# Patient Record
Sex: MALE | Race: WHITE
[De-identification: names, ages, dates, MRNs, and addresses within clinical notes are randomized per-mention and may not be internally consistent; named-entity substitution may affect disease eponyms.]

---

## 2017-05-12 ENCOUNTER — HOSPITAL ENCOUNTER (OUTPATIENT)
Dept: HOSPITAL 47 - RADCTMAIN | Age: 62
Discharge: HOME | End: 2017-05-12
Payer: COMMERCIAL

## 2017-05-12 DIAGNOSIS — R93.0: Primary | ICD-10-CM

## 2017-05-12 DIAGNOSIS — R51: ICD-10-CM

## 2017-05-12 PROCEDURE — 70460 CT HEAD/BRAIN W/DYE: CPT

## 2017-05-12 NOTE — CT
EXAMINATION TYPE: CT brain w con

 

DATE OF EXAM: 5/12/2017 3:52 PM

 

COMPARISON: NONE

 

HISTORY: Headaches without trauma

 

CT DLP: 1121 mGycm

Automated exposure control for dose reduction was used.

 

CONTRAST: 

CT scan of the head is performed with IV Contrast, patient injected with 100 mL of Omnipaque 300.

 

FINDINGS: There is an area of low-attenuation in the right frontal lobe adjacent to the frontal horn 
of the right lateral ventricle. This shows no abnormal enhancement and may relate to previous infarct
.

 

Central structures are midline. There is no evidence of hydrocephalus. There is no mass effect or mid
line shift. I do not see evidence of intracranial blood.

 

Following intravenous administration of contrast but do not see evidence of abnormal enhancement.

 

Visualized portions of the paranasal sinuses and mastoids are clear. No depressed skull fracture is s
een.

 

IMPRESSION:

1. NONENHANCING AREA OF LOW ATTENUATION IN THE RIGHT FRONTAL LOBE CONSISTENT WITH OLD VASCULAR INJURY
.

2. NO AREAS OF ABNORMAL ENHANCEMENT.

## 2017-08-28 ENCOUNTER — HOSPITAL ENCOUNTER (OUTPATIENT)
Dept: HOSPITAL 47 - LABPAT | Age: 62
Discharge: HOME | End: 2017-08-28
Payer: COMMERCIAL

## 2017-08-28 DIAGNOSIS — Z01.812: Primary | ICD-10-CM

## 2017-08-28 DIAGNOSIS — I25.10: ICD-10-CM

## 2017-08-28 LAB
ANION GAP SERPL CALC-SCNC: 9 MMOL/L
BUN SERPL-SCNC: 15 MG/DL (ref 9–20)
CH: 31.9
CHCM: 34.3
CHLORIDE SERPL-SCNC: 104 MMOL/L (ref 98–107)
CO2 SERPL-SCNC: 28 MMOL/L (ref 22–30)
ERYTHROCYTE [DISTWIDTH] IN BLOOD BY AUTOMATED COUNT: 4.76 M/UL (ref 4.3–5.9)
ERYTHROCYTE [DISTWIDTH] IN BLOOD: 15.1 % (ref 11.5–15.5)
HCT VFR BLD AUTO: 44.6 % (ref 39–53)
HDW: 3.08
HGB BLD-MCNC: 14.7 GM/DL (ref 13–17.5)
MCH RBC QN AUTO: 30.8 PG (ref 25–35)
MCHC RBC AUTO-ENTMCNC: 32.9 G/DL (ref 31–37)
MCV RBC AUTO: 93.7 FL (ref 80–100)
NON-AFRICAN AMERICAN GFR(MDRD): >60
POTASSIUM SERPL-SCNC: 5 MMOL/L (ref 3.5–5.1)
SODIUM SERPL-SCNC: 141 MMOL/L (ref 137–145)
WBC # BLD AUTO: 6.3 K/UL (ref 3.8–10.6)

## 2017-08-28 PROCEDURE — 85027 COMPLETE CBC AUTOMATED: CPT

## 2017-08-28 PROCEDURE — 84520 ASSAY OF UREA NITROGEN: CPT

## 2017-08-28 PROCEDURE — 80051 ELECTROLYTE PANEL: CPT

## 2017-08-28 PROCEDURE — 82565 ASSAY OF CREATININE: CPT

## 2017-09-29 ENCOUNTER — HOSPITAL ENCOUNTER (OUTPATIENT)
Dept: HOSPITAL 47 - RADMRIMAIN | Age: 62
Discharge: HOME | End: 2017-09-29
Payer: COMMERCIAL

## 2017-09-29 DIAGNOSIS — R90.82: ICD-10-CM

## 2017-09-29 DIAGNOSIS — G93.89: ICD-10-CM

## 2017-09-29 DIAGNOSIS — I65.21: Primary | ICD-10-CM

## 2017-09-29 PROCEDURE — 70553 MRI BRAIN STEM W/O & W/DYE: CPT

## 2017-09-29 PROCEDURE — 70544 MR ANGIOGRAPHY HEAD W/O DYE: CPT

## 2017-09-29 NOTE — MR
EXAMINATION TYPE: MR brain wo/w con, MR angio head wo con

 

DATE OF EXAM: 9/29/2017

 

COMPARISON: CT brain dated 5/12/2017

 

HISTORY: TIA

 

TECHNIQUE: 

Multiplanar, multisequence images of the brain and brainstem is performed without and with IV contras
t, utilizing 7 mL intravenous Gadavist . MRA head was also performed and time of flight images focusi
ng on the [place] of [place] were performed without contrast.

 

FINDINGS: 

 

MR BRAIN: Diffusion weighted images demonstrate no evidence of a recent infarct or other diffusion ab
normality.  There is no extra-axial fluid collection.  T2 hyperintense area within the right frontal 
lobe and insular cortex does not restricted diffusion and relates to encephalomalacia from prior infa
rct in the distribution of the right middle cerebral artery. Numerous other foci of T2 hyperintensity
 are scattered throughout the subcortical and periventricular white matter bilaterally. The prior are
a of encephalomalacia nor the T2 plaques enhance. No enhancing mass or any abnormal enhancement is se
en within the brain. Note is made of high riding right jugular bulb. Lack of flow-void is seen within
 the right internal carotid artery as it enters the calvarium extending through the petrous portion a
nd cavernous portion into the inferior aspect of the supraclinoid portion. The ventricular system and
 cisternal spaces are normal in size and appearance.  The brain volume is age appropriate.

 

Midline structures demonstrate normal morphology.  The craniocervical junction appears within normal 
limits.  The dural venous sinuses appear patent. The globes are intact. Minimal mucosal thickening is
 seen within the ethmoid and maxillary sinuses. Remaining paranasal sinuses and mastoid air cells are
 well aerated.

 

ANGIO HEAD: There is complete occlusion of the right internal carotid artery as it enters the calvari
um, within the petrous portion, cavernous portion, and some of the supraclinoid portion. There is rec
onstitution at the cranial aspect of the supraclinoid right internal carotid artery with asymmetric o
pacification of the right ophthalmic artery in relation to the left, although there is flow demonstra
tiki. Reconstitution of the right hemisphere branch vessels of the MCA and LILLIANA is seen by a complete c
ircle of [place]. No evidence of aneurysm is seen. A false lumen is not identified as there is near to
vonda lack of flow void on the MR brain. The vertebral arteries are codominant.

 

IMPRESSION: 

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 
segment to the inferior aspect of the supraclinoid portion with asymmetric enhancement of the right o
phthalmic artery, although the artery does appear patent. Encephalomalacia from prior right frontal M
CA branch vessel infarct is seen as well as numerous other nonenhancing white matter changes bilatera
lly within the supratentorium. No evidence of restricted diffusion to suggest acute infarct. Consider
ation should be given to embolic disease given the numerous bilateral plaques although these could al
so represent chronic microangiopathy. No discrete intimal flap is seen within the right carotid arter
y to indicate dissection. Right hemispheric supratentorial and infratentorial flow is maintained by a
 complete [place].

 

A [place] message has been communicated to Ariel Philip MD via the [employer] Critical Result
 system on 9/29/2017 7:28 AM, Message ID [id number].

## 2018-01-16 ENCOUNTER — HOSPITAL ENCOUNTER (OUTPATIENT)
Dept: HOSPITAL 47 - RADXRMAIN | Age: 63
Discharge: HOME | End: 2018-01-16
Attending: PHYSICIAN ASSISTANT
Payer: COMMERCIAL

## 2018-01-16 DIAGNOSIS — S46.901A: Primary | ICD-10-CM

## 2018-01-16 NOTE — XR
EXAMINATION TYPE: XR humerus RT

 

DATE OF EXAM: 1/16/2018

 

COMPARISON: NONE

 

HISTORY: Pain and swelling

 

TECHNIQUE: 2 views

 

FINDINGS: I see no fracture nor dislocation. The shoulder joint and elbow joint appear intact.

 

IMPRESSION: Negative right humerus exam.

## 2018-02-28 ENCOUNTER — HOSPITAL ENCOUNTER (OUTPATIENT)
Dept: HOSPITAL 47 - EC | Age: 63
Setting detail: OBSERVATION
LOS: 1 days | Discharge: HOME | End: 2018-03-01
Payer: COMMERCIAL

## 2018-02-28 DIAGNOSIS — R42: ICD-10-CM

## 2018-02-28 DIAGNOSIS — Z87.891: ICD-10-CM

## 2018-02-28 DIAGNOSIS — R06.02: ICD-10-CM

## 2018-02-28 DIAGNOSIS — E66.9: ICD-10-CM

## 2018-02-28 DIAGNOSIS — I65.21: ICD-10-CM

## 2018-02-28 DIAGNOSIS — I10: ICD-10-CM

## 2018-02-28 DIAGNOSIS — Z79.899: ICD-10-CM

## 2018-02-28 DIAGNOSIS — R06.00: ICD-10-CM

## 2018-02-28 DIAGNOSIS — I35.2: ICD-10-CM

## 2018-02-28 DIAGNOSIS — Z79.02: ICD-10-CM

## 2018-02-28 DIAGNOSIS — Z90.49: ICD-10-CM

## 2018-02-28 DIAGNOSIS — I35.1: ICD-10-CM

## 2018-02-28 DIAGNOSIS — Z86.73: ICD-10-CM

## 2018-02-28 DIAGNOSIS — E78.1: ICD-10-CM

## 2018-02-28 DIAGNOSIS — Z82.49: ICD-10-CM

## 2018-02-28 DIAGNOSIS — Z79.82: ICD-10-CM

## 2018-02-28 DIAGNOSIS — R07.89: Primary | ICD-10-CM

## 2018-02-28 DIAGNOSIS — E78.5: ICD-10-CM

## 2018-02-28 LAB
ALBUMIN SERPL-MCNC: 4.4 G/DL (ref 3.5–5)
ALP SERPL-CCNC: 65 U/L (ref 38–126)
ALT SERPL-CCNC: 39 U/L (ref 21–72)
ANION GAP SERPL CALC-SCNC: 11 MMOL/L
APTT BLD: 25.3 SEC (ref 22–30)
AST SERPL-CCNC: 27 U/L (ref 17–59)
BASOPHILS # BLD AUTO: 0 K/UL (ref 0–0.2)
BASOPHILS NFR BLD AUTO: 1 %
BUN SERPL-SCNC: 15 MG/DL (ref 9–20)
CALCIUM SPEC-MCNC: 9.2 MG/DL (ref 8.4–10.2)
CHLORIDE SERPL-SCNC: 107 MMOL/L (ref 98–107)
CK SERPL-CCNC: 173 U/L (ref 55–170)
CK SERPL-CCNC: 207 U/L (ref 55–170)
CO2 SERPL-SCNC: 25 MMOL/L (ref 22–30)
EOSINOPHIL # BLD AUTO: 0.3 K/UL (ref 0–0.7)
EOSINOPHIL NFR BLD AUTO: 6 %
ERYTHROCYTE [DISTWIDTH] IN BLOOD BY AUTOMATED COUNT: 4.26 M/UL (ref 4.3–5.9)
ERYTHROCYTE [DISTWIDTH] IN BLOOD: 13.6 % (ref 11.5–15.5)
GLUCOSE SERPL-MCNC: 117 MG/DL (ref 74–99)
HCT VFR BLD AUTO: 37.4 % (ref 39–53)
HGB BLD-MCNC: 12.9 GM/DL (ref 13–17.5)
INR PPP: 1 (ref ?–1.2)
LYMPHOCYTES # SPEC AUTO: 1.6 K/UL (ref 1–4.8)
LYMPHOCYTES NFR SPEC AUTO: 33 %
MCH RBC QN AUTO: 30.4 PG (ref 25–35)
MCHC RBC AUTO-ENTMCNC: 34.5 G/DL (ref 31–37)
MCV RBC AUTO: 87.9 FL (ref 80–100)
MONOCYTES # BLD AUTO: 0.4 K/UL (ref 0–1)
MONOCYTES NFR BLD AUTO: 8 %
NEUTROPHILS # BLD AUTO: 2.5 K/UL (ref 1.3–7.7)
NEUTROPHILS NFR BLD AUTO: 50 %
PLATELET # BLD AUTO: 159 K/UL (ref 150–450)
POTASSIUM SERPL-SCNC: 4.3 MMOL/L (ref 3.5–5.1)
PROT SERPL-MCNC: 7 G/DL (ref 6.3–8.2)
PT BLD: 10.1 SEC (ref 9–12)
SODIUM SERPL-SCNC: 143 MMOL/L (ref 137–145)
TROPONIN I SERPL-MCNC: <0.012 NG/ML (ref 0–0.03)
TROPONIN I SERPL-MCNC: <0.012 NG/ML (ref 0–0.03)
WBC # BLD AUTO: 4.9 K/UL (ref 3.8–10.6)

## 2018-02-28 PROCEDURE — 85730 THROMBOPLASTIN TIME PARTIAL: CPT

## 2018-02-28 PROCEDURE — 80053 COMPREHEN METABOLIC PANEL: CPT

## 2018-02-28 PROCEDURE — 36415 COLL VENOUS BLD VENIPUNCTURE: CPT

## 2018-02-28 PROCEDURE — 93005 ELECTROCARDIOGRAM TRACING: CPT

## 2018-02-28 PROCEDURE — 80061 LIPID PANEL: CPT

## 2018-02-28 PROCEDURE — 82550 ASSAY OF CK (CPK): CPT

## 2018-02-28 PROCEDURE — 93306 TTE W/DOPPLER COMPLETE: CPT

## 2018-02-28 PROCEDURE — 99285 EMERGENCY DEPT VISIT HI MDM: CPT

## 2018-02-28 PROCEDURE — 85610 PROTHROMBIN TIME: CPT

## 2018-02-28 PROCEDURE — 71046 X-RAY EXAM CHEST 2 VIEWS: CPT

## 2018-02-28 PROCEDURE — 85025 COMPLETE CBC W/AUTO DIFF WBC: CPT

## 2018-02-28 PROCEDURE — 96374 THER/PROPH/DIAG INJ IV PUSH: CPT

## 2018-02-28 PROCEDURE — 82553 CREATINE MB FRACTION: CPT

## 2018-02-28 PROCEDURE — 83735 ASSAY OF MAGNESIUM: CPT

## 2018-02-28 PROCEDURE — 84484 ASSAY OF TROPONIN QUANT: CPT

## 2018-02-28 RX ADMIN — NITROGLYCERIN SCH INCH: 20 OINTMENT TOPICAL at 23:28

## 2018-02-28 RX ADMIN — NITROGLYCERIN SCH: 20 OINTMENT TOPICAL at 18:20

## 2018-02-28 RX ADMIN — LISINOPRIL SCH MG: 20 TABLET ORAL at 20:35

## 2018-02-28 NOTE — XR
EXAMINATION TYPE: XR chest 2V

 

DATE OF EXAM: 2/28/2018

 

COMPARISON: NONE

 

HISTORY: Shortness of breath

 

TECHNIQUE:  Frontal and lateral views of the chest are obtained.

 

FINDINGS:

 

Scattered senescent parenchymal changes noted. Hyperinflation compatible with COPD. 

 

No evidence for infiltrate. No evidence for atelectasis.

 

Heart size is stable.

 

Mediastinal structures are stable and grossly unremarkable.

 

No evidence for hilar prominence.

 

Degenerative changes dorsal spine. 

 

IMPRESSION:

1. No evidence for acute pulmonary disease.

## 2018-02-28 NOTE — ED
General Adult HPI





- General


Chief complaint: Chest Pain


Stated complaint: Chest pain


Time Seen by Provider: 02/28/18 15:06


Source: patient, RN notes reviewed


Mode of arrival: wheelchair


Limitations: no limitations





- History of Present Illness


Initial comments: 





Patient is a pleasant 63-year-old male presenting to the emergency Department 

with chest discomfort.  Symptoms have been waxing and waning the past several 

days.  Discomfort is currently moderate and rated 5/10.  Discomfort feels like 

an ache.  Patient does have exertional dyspnea.  No nausea or diaphoresis.  

Patient has had similar symptoms previously, last heart catheterization was 

approximately 6 months ago without intervention.





- Related Data


 Home Medications











 Medication  Instructions  Recorded  Confirmed


 


Aspirin 325 mg PO DAILY 02/28/18 02/28/18


 


Atenolol 100 mg PO DAILY 02/28/18 02/28/18


 


Atorvastatin [Lipitor] 10 mg PO HS 02/28/18 02/28/18


 


Cholecalciferol (Vitamin D3) 2,000 unit PO DAILY 02/28/18 02/28/18





[Vitamin D3]   


 


Citalopram Hydrobromide 40 mg PO DAILY 02/28/18 02/28/18





[Citalopram HBr]   


 


Clopidogrel [Plavix] 75 mg PO HS 02/28/18 02/28/18


 


Icosapent Ethyl [Vascepa] 0.5 gm PO DAILY 02/28/18 02/28/18


 


Lisinopril [Zestril] 20 mg PO BID 02/28/18 02/28/18


 


Omeprazole 40 mg PO DAILY 02/28/18 02/28/18











 Allergies











Allergy/AdvReac Type Severity Reaction Status Date / Time


 


No Known Allergies Allergy   Verified 02/28/18 15:36














Review of Systems


ROS Statement: 


Those systems with pertinent positive or pertinent negative responses have been 

documented in the HPI.





ROS Other: All systems not noted in ROS Statement are negative.


Constitutional: Denies: fever


Eyes: Denies: eye pain


ENT: Denies: ear pain


Respiratory: Reports: dyspnea.  Denies: cough


Cardiovascular: Reports: chest pain


Endocrine: Denies: fatigue


Gastrointestinal: Denies: abdominal pain


Genitourinary: Denies: dysuria


Musculoskeletal: Denies: back pain


Skin: Denies: rash


Neurological: Denies: weakness





Past Medical History


Past Medical History: CVA/TIA


History of Any Multi-Drug Resistant Organisms: None Reported


Past Surgical History: Cholecystectomy


Past Psychological History: No Psychological Hx Reported


Smoking Status: Former smoker


Past Alcohol Use History: None Reported


Past Drug Use History: None Reported





General Exam


Limitations: no limitations


General appearance: alert, in no apparent distress


Head exam: Present: atraumatic


Eye exam: Present: normal appearance, PERRL


ENT exam: Present: normal oropharynx


Neck exam: Present: normal inspection


Respiratory exam: Present: normal lung sounds bilaterally


Cardiovascular Exam: Present: regular rate, normal rhythm


  ** Expanded


Peripheral pulses: 2+: Radial (R), Radial (L), Posterior Tibialis (R), 

Posterior Tibialis (L)


GI/Abdominal exam: Present: soft.  Absent: tenderness


Extremities exam: Present: normal inspection.  Absent: pedal edema, calf 

tenderness


Neurological exam: Present: alert


Psychiatric exam: Present: normal affect, normal mood


Skin exam: Present: normal color





Course


 Vital Signs











  02/28/18 02/28/18 02/28/18





  14:59 15:28 15:33


 


Temperature 98.1 F  


 


Pulse Rate 75 69 52 L


 


Respiratory 20 16 





Rate   


 


Blood Pressure 126/72 129/74 109/59


 


O2 Sat by Pulse 99 98 





Oximetry   














  02/28/18 02/28/18





  15:40 16:05


 


Temperature  


 


Pulse Rate 69 64


 


Respiratory 16 





Rate  


 


Blood Pressure 118/60 113/67


 


O2 Sat by Pulse 95 





Oximetry  














EKG Findings





- EKG Comments:


EKG Findings:: Normal sinus rhythm 66.  .  .  .  .  

Normal axis.  Right bundle branch block.  No acute ST change.





Medical Decision Making





- Medical Decision Making





Patient reevaluated and resting comfortably in bed.  Patient updated on results 

and plan.  Case was discussed with practitioner Vanda, who will admit for Dr. Philip.





- Lab Data


Result diagrams: 


 02/28/18 15:22





 02/28/18 15:22


 Lab Results











  02/28/18 02/28/18 02/28/18 Range/Units





  15:22 15:22 15:22 


 


WBC   4.9   (3.8-10.6)  k/uL


 


RBC   4.26 L   (4.30-5.90)  m/uL


 


Hgb   12.9 L   (13.0-17.5)  gm/dL


 


Hct   37.4 L   (39.0-53.0)  %


 


MCV   87.9   (80.0-100.0)  fL


 


MCH   30.4   (25.0-35.0)  pg


 


MCHC   34.5   (31.0-37.0)  g/dL


 


RDW   13.6   (11.5-15.5)  %


 


Plt Count   159   (150-450)  k/uL


 


Neutrophils %   50   %


 


Lymphocytes %   33   %


 


Monocytes %   8   %


 


Eosinophils %   6   %


 


Basophils %   1   %


 


Neutrophils #   2.5   (1.3-7.7)  k/uL


 


Lymphocytes #   1.6   (1.0-4.8)  k/uL


 


Monocytes #   0.4   (0-1.0)  k/uL


 


Eosinophils #   0.3   (0-0.7)  k/uL


 


Basophils #   0.0   (0-0.2)  k/uL


 


PT     (9.0-12.0)  sec


 


INR     (<1.2)  


 


APTT     (22.0-30.0)  sec


 


Sodium    143  (137-145)  mmol/L


 


Potassium    4.3  (3.5-5.1)  mmol/L


 


Chloride    107  ()  mmol/L


 


Carbon Dioxide    25  (22-30)  mmol/L


 


Anion Gap    11  mmol/L


 


BUN    15  (9-20)  mg/dL


 


Creatinine    0.90  (0.66-1.25)  mg/dL


 


Est GFR (MDRD) Af Amer    >60  (>60 ml/min/1.73 sqM)  


 


Est GFR (MDRD) Non-Af    >60  (>60 ml/min/1.73 sqM)  


 


Glucose    117 H  (74-99)  mg/dL


 


Calcium    9.2  (8.4-10.2)  mg/dL


 


Magnesium    1.9  (1.6-2.3)  mg/dL


 


Total Bilirubin    0.8  (0.2-1.3)  mg/dL


 


AST    27  (17-59)  U/L


 


ALT    39  (21-72)  U/L


 


Alkaline Phosphatase    65  ()  U/L


 


Total Creatine Kinase  207 H    ()  U/L


 


CK-MB (CK-2)  1.0    (0.0-2.4)  ng/mL


 


CK-MB (CK-2) Rel Index  0.5    


 


Troponin I  <0.012    (0.000-0.034)  ng/mL


 


Total Protein    7.0  (6.3-8.2)  g/dL


 


Albumin    4.4  (3.5-5.0)  g/dL














  02/28/18 Range/Units





  15:22 


 


WBC   (3.8-10.6)  k/uL


 


RBC   (4.30-5.90)  m/uL


 


Hgb   (13.0-17.5)  gm/dL


 


Hct   (39.0-53.0)  %


 


MCV   (80.0-100.0)  fL


 


MCH   (25.0-35.0)  pg


 


MCHC   (31.0-37.0)  g/dL


 


RDW   (11.5-15.5)  %


 


Plt Count   (150-450)  k/uL


 


Neutrophils %   %


 


Lymphocytes %   %


 


Monocytes %   %


 


Eosinophils %   %


 


Basophils %   %


 


Neutrophils #   (1.3-7.7)  k/uL


 


Lymphocytes #   (1.0-4.8)  k/uL


 


Monocytes #   (0-1.0)  k/uL


 


Eosinophils #   (0-0.7)  k/uL


 


Basophils #   (0-0.2)  k/uL


 


PT  10.1  (9.0-12.0)  sec


 


INR  1.0  (<1.2)  


 


APTT  25.3  (22.0-30.0)  sec


 


Sodium   (137-145)  mmol/L


 


Potassium   (3.5-5.1)  mmol/L


 


Chloride   ()  mmol/L


 


Carbon Dioxide   (22-30)  mmol/L


 


Anion Gap   mmol/L


 


BUN   (9-20)  mg/dL


 


Creatinine   (0.66-1.25)  mg/dL


 


Est GFR (MDRD) Af Amer   (>60 ml/min/1.73 sqM)  


 


Est GFR (MDRD) Non-Af   (>60 ml/min/1.73 sqM)  


 


Glucose   (74-99)  mg/dL


 


Calcium   (8.4-10.2)  mg/dL


 


Magnesium   (1.6-2.3)  mg/dL


 


Total Bilirubin   (0.2-1.3)  mg/dL


 


AST   (17-59)  U/L


 


ALT   (21-72)  U/L


 


Alkaline Phosphatase   ()  U/L


 


Total Creatine Kinase   ()  U/L


 


CK-MB (CK-2)   (0.0-2.4)  ng/mL


 


CK-MB (CK-2) Rel Index   


 


Troponin I   (0.000-0.034)  ng/mL


 


Total Protein   (6.3-8.2)  g/dL


 


Albumin   (3.5-5.0)  g/dL














- Radiology Data


Radiology results: image reviewed (Chest x-ray shows no acute process.)





Disposition


Clinical Impression: 


 Chest pain





Disposition: ADMITTED AS IP TO THIS [facility]


Referrals: 


Ariel Philip MD [Primary Care Provider] - 1-2 days


Decision Time: 16:23

## 2018-03-01 VITALS — DIASTOLIC BLOOD PRESSURE: 70 MMHG | HEART RATE: 66 BPM | TEMPERATURE: 97.2 F | SYSTOLIC BLOOD PRESSURE: 116 MMHG

## 2018-03-01 VITALS — RESPIRATION RATE: 18 BRPM

## 2018-03-01 LAB
CHOLEST SERPL-MCNC: 75 MG/DL (ref ?–200)
CK SERPL-CCNC: 164 U/L (ref 55–170)
HDLC SERPL-MCNC: 28 MG/DL (ref 40–60)
TRIGL SERPL-MCNC: 283 MG/DL (ref ?–150)
TROPONIN I SERPL-MCNC: <0.012 NG/ML (ref 0–0.03)

## 2018-03-01 RX ADMIN — NITROGLYCERIN SCH INCH: 20 OINTMENT TOPICAL at 06:28

## 2018-03-01 RX ADMIN — NITROGLYCERIN SCH: 20 OINTMENT TOPICAL at 15:31

## 2018-03-01 RX ADMIN — LISINOPRIL SCH MG: 20 TABLET ORAL at 11:47

## 2018-03-01 NOTE — P.HPIM
History of Present Illness





63-year-old male presented to his family physician yesterday with complaints of 

intermittent chest pain chest pressure radiating to left arm patient was 

evaluated in emergency room and admitted to observation for cardiology workup.  

Patient found resting in bed on awaking stating he continues to have chest pain 

that goes to his arm nurse and cardiologist informed troponins negative 3





Review of Systems


Cardiovascular: Reports chest pain





Past Medical History


Past Medical History: CVA/TIA, Hyperlipidemia, Hypertension


Additional Past Medical History / Comment(s): rt side dominant."rt carotid 

artery 100% blocked",  past "stroke"  some mild short term memory problems", 

throat polyps removed pt stated were cancerous


History of Any Multi-Drug Resistant Organisms: None Reported


Past Surgical History: Back Surgery, Cholecystectomy, Heart Catheterization, 

Hernia Repair


Additional Past Surgical History / Comment(s): carolynn inguinal hernia repair, 

throat polpys removed


Past Anesthesia/Blood Transfusion Reactions: Previous Problems w/ Anesthesia, 

Motion Sickness


Additional Past Anesthesia/Blood Transfusion Reaction / Comment(s): slow to 

wake up after aa


Smoking Status: Former smoker





- Past Family History


  ** Mother


Family Medical History: CVA/TIA





  ** Father


Family Medical History: CVA/TIA, Myocardial Infarction (MI)





Medications and Allergies


 Home Medications











 Medication  Instructions  Recorded  Confirmed  Type


 


Aspirin 325 mg PO DAILY 02/28/18 02/28/18 History


 


Atenolol 100 mg PO DAILY 02/28/18 02/28/18 History


 


Atorvastatin [Lipitor] 10 mg PO HS 02/28/18 02/28/18 History


 


Cholecalciferol (Vitamin D3) 2,000 unit PO DAILY 02/28/18 02/28/18 History





[Vitamin D3]    


 


Citalopram Hydrobromide 40 mg PO DAILY 02/28/18 02/28/18 History





[Citalopram HBr]    


 


Clopidogrel [Plavix] 75 mg PO HS 02/28/18 02/28/18 History


 


Icosapent Ethyl [Vascepa] 0.5 gm PO DAILY 02/28/18 02/28/18 History


 


Lisinopril [Zestril] 20 mg PO BID 02/28/18 02/28/18 History


 


Omeprazole 40 mg PO DAILY 02/28/18 02/28/18 History











 Allergies











Allergy/AdvReac Type Severity Reaction Status Date / Time


 


No Known Allergies Allergy   Verified 02/28/18 15:36














Physical Exam


Vitals: 


 Vital Signs











  Temp Pulse Pulse Resp BP BP BP


 


 03/01/18 08:00  98.2 F   75  18    136/74


 


 03/01/18 04:00  97.9 F   65  16   122/71 


 


 03/01/18 03:55     16   


 


 03/01/18 00:00     16   


 


 02/28/18 23:52  98.1 F   65  16   126/77 


 


 02/28/18 20:00     16   


 


 02/28/18 19:39  97.7 F   70  16   162/88 


 


 02/28/18 17:35  98.1 F   60  17   132/77 


 


 02/28/18 17:06  97.8 F  65   16  111/60  


 


 02/28/18 16:05   64    113/67  


 


 02/28/18 15:40   69   16  118/60  


 


 02/28/18 15:33   52 L    109/59  


 


 02/28/18 15:28   69   16  129/74  


 


 02/28/18 14:59  98.1 F  75   20  126/72  














  Pulse Ox


 


 03/01/18 08:00  95


 


 03/01/18 04:00  96


 


 03/01/18 03:55 


 


 03/01/18 00:00 


 


 02/28/18 23:52  96


 


 02/28/18 20:00 


 


 02/28/18 19:39  97


 


 02/28/18 17:35  97


 


 02/28/18 17:06  95


 


 02/28/18 16:05 


 


 02/28/18 15:40  95


 


 02/28/18 15:33 


 


 02/28/18 15:28  98


 


 02/28/18 14:59  99








 Intake and Output











 02/28/18 03/01/18 03/01/18





 22:59 06:59 14:59


 


Other:   


 


  Voiding Method Toilet Toilet Toilet


 


  # Voids  1 


 


  Weight 76.7 kg  














- Constitutional


General appearance: mild distress





- EENT


Eyes: PERRLA


Ears: bilateral: normal





- Neck


Neck: normal ROM





- Respiratory


Respiratory: bilateral: CTA





- Cardiovascular


Rhythm: regular





- Gastrointestinal


General gastrointestinal: soft





- Integumentary


Integumentary: normal





- Neurologic


Neurologic: CNII-XII intact





- Psychiatric


Psychiatric: A&O x's 3, appropriate affect, intact judgment & insight





Results


CBC & Chem 7: 


 02/28/18 15:22





 02/28/18 15:22


Labs: 


 Abnormal Lab Results - Last 24 Hours (Table)











  02/28/18 02/28/18 02/28/18 Range/Units





  15:22 15:22 15:22 


 


RBC   4.26 L   (4.30-5.90)  m/uL


 


Hgb   12.9 L   (13.0-17.5)  gm/dL


 


Hct   37.4 L   (39.0-53.0)  %


 


Glucose    117 H  (74-99)  mg/dL


 


Total Creatine Kinase  207 H    ()  U/L


 


Triglycerides     (<150)  mg/dL


 


HDL Cholesterol     (40-60)  mg/dL














  02/28/18 03/01/18 Range/Units





  20:59 03:40 


 


RBC    (4.30-5.90)  m/uL


 


Hgb    (13.0-17.5)  gm/dL


 


Hct    (39.0-53.0)  %


 


Glucose    (74-99)  mg/dL


 


Total Creatine Kinase  173 H   ()  U/L


 


Triglycerides   283 H  (<150)  mg/dL


 


HDL Cholesterol   28 L  (40-60)  mg/dL











Chest x-ray: report reviewed





Thrombosis Risk Factor Assmnt





- Choose All That Apply


Any of the Below Risk Factors Present?: Yes


Each Factor Represents 1 point: Obesity (BMI >25)


Other Risk Factors: Yes


Each Risk Factor Represents 2 Points: Age 61-74 years


Other congenital or acquired thrombophilia - If yes, enter type in comment: No


Thrombosis Risk Factor Assessment Total Risk Factor Score: 3


Thrombosis Risk Factor Assessment Level: Moderate Risk





Assessment and Plan


Plan: 





Assessment


Chest pain troponins negative 3


History of CVA/TIA


Hyperlipidemia


Hypertension





Plan


Continue consultation with cardiology


Possible stress test tomorrow

## 2018-03-01 NOTE — P.DS
Providers


Date of admission: 


02/28/18 16:24





Expected date of discharge: 03/01/18


Attending physician: 


Ariel Philip





Consults: 





 





02/28/18 16:24


Consult Physician Urgent 


   Consulting Provider: Jack Duong


   Consult Reason/Comments: cp


   Do you want consulting provider notified?: Yes











Primary care physician: 


Ariel Philip





[facility] Course: 





63-year-old male presented to the emergency room after a visit with family 

physician for chest pain. Patient was availed evaluated by cardiology cleared 

for discharge. Troponins were negative times three. Cardiology feels chest pain 

was chest wall pain from increasing exercise. Patient is to follow up with 

family physician and Dr. Duong to three weeks.





Assessment chest pain atypical chest wall troponins negative


history of CVA/TIA


hyperlipidemia


hypertension 


carotid artery disease





Nondramatic aortic stenosis





Plan


follow up with the family physician in [facility]


discharge home


Patient Condition at Discharge: Serious





Plan - Discharge Summary


Discharge Rx Participant: No


New Discharge Prescriptions: 


Continue


   Citalopram Hydrobromide [Citalopram HBr] 40 mg PO DAILY


   Cholecalciferol (Vitamin D3) [Vitamin D3] 2,000 unit PO DAILY


   Lisinopril [Zestril] 20 mg PO BID


   Atenolol 100 mg PO DAILY


   Omeprazole 40 mg PO DAILY


   Clopidogrel [Plavix] 75 mg PO HS


   Atorvastatin [Lipitor] 10 mg PO HS


   Aspirin 325 mg PO DAILY


   Icosapent Ethyl [Vascepa] 0.5 gm PO DAILY


Discharge Medication List





Aspirin 325 mg PO DAILY 02/28/18 [History]


Atenolol 100 mg PO DAILY 02/28/18 [History]


Atorvastatin [Lipitor] 10 mg PO HS 02/28/18 [History]


Cholecalciferol (Vitamin D3) [Vitamin D3] 2,000 unit PO DAILY 02/28/18 [History]


Citalopram Hydrobromide [Citalopram HBr] 40 mg PO DAILY 02/28/18 [History]


Clopidogrel [Plavix] 75 mg PO HS 02/28/18 [History]


Icosapent Ethyl [Vascepa] 0.5 gm PO DAILY 02/28/18 [History]


Lisinopril [Zestril] 20 mg PO BID 02/28/18 [History]


Omeprazole 40 mg PO DAILY 02/28/18 [History]








Follow up Appointment(s)/Referral(s): 


Ariel Philip MD [Primary Care Provider] - 1-2 days


Jack Duong MD [STAFF PHYSICIAN] - 1 Week


(Follow up appoimntment is on March 23rd at 1:45 pm with Dr. Duong


)

## 2018-03-02 NOTE — ECHOF
Referral Reason:chest pain/sob



MEASUREMENTS

--------

HEIGHT: 157.5 cm

WEIGHT: 76.7 kg

BP: 

IVSd:   1.2 cm     (0.6 - 1.1)

LVIDd:   4.1 cm     (3.9 - 5.3)

LVPWd:   1.0 cm     (0.6 - 1.1)

IVSs:   1.8 cm

LVIDs:   2.1 cm

LVPWs:   1.9 cm

LAESV Index (A-L):   17.21 ml/m

Ao Diam:   3.2 cm     (2.0 - 3.7)

AV Cusp:   1.5 cm     (1.5 - 2.6)

LA Diam:   3.4 cm     (2.7 - 3.8)

MV EXCURSION:   14.924 mm     (> 18.000)

MV EF SLOPE:   84 mm/s     (70 - 150)

EPSS:   0.5 cm

MV E Barney:   0.66 m/s

MV DecT:   239 ms

MV A Barney:   0.94 m/s

MV E/A Ratio:   0.71 

AV maxP.38 mmHg

AV meanP.68 mmHg

AR PHT:   505 ms

RAP:   5.00 mmHg

RVSP:   21.08 mmHg







FINDINGS

--------

Sinus rhythm.

This was a technically good study.

The left ventricular size is normal.   There is borderline concentric left ventricular hypertrophy.  
 Overall left ventricular systolic function is normal with, an EF between 55 - 60 %.

The right ventricle is normal in size and function.

Normal LA  size by volume 22+/-6 ml/m2.

The right atrium is normal in size.

Aortic valve is trileaflet and is moderately thickened.   There is mild aortic regurgitation.   There
 is mild aortic stenosis present.   Peak/mean gradient across the Aortic Valve is 28.38mmHg / 12.68mm
Hg.

The mitral valve leaflets are mildly thickened.   Mild mitral annular calcification present.   Mild m
itral regurgitation is present.

Mild tricuspid regurgitation present.   The right ventricular systolic pressure, as measured by Doppl
er, is 21.08mmHg.

Pulmonic valve appears structurally normal.

The aortic root size is normal.

Normal inferior vena cava with normal inspiratory collapse consistent with estimated right atrial pre
ssure of  5 mmHg.

The pericardium is normal.



CONCLUSIONS

--------

1. Sinus rhythm.

2. This was a technically good study.

3. The left ventricular size is normal.

4. There is borderline concentric left ventricular hypertrophy.

5. Overall left ventricular systolic function is normal with, an EF between 55 - 60 %.

6. The right ventricle is normal in size and function.

7. Normal LA size by volume 22+/-6 ml/m2.

8. The right atrium is normal in size.

9. Aortic valve is trileaflet and is moderately thickened.

10. There is mild aortic regurgitation.

11. There is mild aortic stenosis present.

12. Peak/mean gradient across the Aortic Valve is 28.38mmHg / 12.68mmHg.

13. The mitral valve leaflets are mildly thickened.

14. Mild mitral annular calcification present.

15. Mild mitral regurgitation is present.

16. Mild tricuspid regurgitation present.

17. The right ventricular systolic pressure, as measured by Doppler, is 21.08mmHg.

18. Pulmonic valve appears structurally normal.

19. The aortic root size is normal.

20. Normal inferior vena cava with normal inspiratory collapse consistent with estimated right atrial
 pressure of  5 mmHg.

21. The pericardium is normal.





SONOGRAPHER: Laura Fairchild RDCS

## 2018-06-06 ENCOUNTER — HOSPITAL ENCOUNTER (OUTPATIENT)
Dept: HOSPITAL 47 - LABWHC1 | Age: 63
Discharge: HOME | End: 2018-06-06
Payer: COMMERCIAL

## 2018-06-06 DIAGNOSIS — G44.89: Primary | ICD-10-CM

## 2018-06-06 PROCEDURE — 82306 VITAMIN D 25 HYDROXY: CPT

## 2018-06-06 PROCEDURE — 85652 RBC SED RATE AUTOMATED: CPT

## 2018-06-06 PROCEDURE — 86140 C-REACTIVE PROTEIN: CPT

## 2018-06-06 PROCEDURE — 36415 COLL VENOUS BLD VENIPUNCTURE: CPT

## 2019-04-12 ENCOUNTER — HOSPITAL ENCOUNTER (OUTPATIENT)
Dept: HOSPITAL 47 - ORWHC2ENDO | Age: 64
Discharge: HOME | End: 2019-04-12
Payer: COMMERCIAL

## 2019-04-12 VITALS — HEART RATE: 78 BPM | SYSTOLIC BLOOD PRESSURE: 125 MMHG | DIASTOLIC BLOOD PRESSURE: 87 MMHG | RESPIRATION RATE: 18 BRPM

## 2019-04-12 VITALS — TEMPERATURE: 98.4 F

## 2019-04-12 VITALS — BODY MASS INDEX: 32.9 KG/M2

## 2019-04-12 DIAGNOSIS — E78.5: ICD-10-CM

## 2019-04-12 DIAGNOSIS — Z87.891: ICD-10-CM

## 2019-04-12 DIAGNOSIS — I10: ICD-10-CM

## 2019-04-12 DIAGNOSIS — Z85.850: ICD-10-CM

## 2019-04-12 DIAGNOSIS — I69.311: ICD-10-CM

## 2019-04-12 DIAGNOSIS — Z82.49: ICD-10-CM

## 2019-04-12 DIAGNOSIS — K57.30: ICD-10-CM

## 2019-04-12 DIAGNOSIS — Z79.82: ICD-10-CM

## 2019-04-12 DIAGNOSIS — Z79.899: ICD-10-CM

## 2019-04-12 DIAGNOSIS — Z12.11: Primary | ICD-10-CM

## 2019-04-12 NOTE — P.PCN
Date of Procedure: 04/12/19


Description of Procedure: 











PREOPERATIVE DIAGNOSIS:


Colonoscopy screening, first





POSTOPERATIVE DIAGNOSIS:


Colonoscopy screening, first


Diverticulosis, sigmoid





OPERATION:


Colonoscopy to the ileocecal valve and appendiceal orifice.





SURGEON: Brenda Blair MD.





ANESTHESIA: MAC.





INDICATIONS:


The patient is a 64-year-old male who presents for his first colonoscopy 

screening.  Benefits and risks were described and informed consent was obtained.





DESCRIPTION OF PROCEDURE:


The patient had undergone Gatorade, MiraLAX and Dulcolax prep. He had been 

brought into the operating room and laid in the left lateral decubitus position.

After adequate intravenous sedation, the rectum was examined with 2% lidocaine 

jelly.  The prostate was smooth and without abnormalities.  No external 

hemorrhoids were encountered. The rectal tone was within normal limits. No 

lesions were palpated in the rectal vault. An Olympus colonoscope was advanced 

until the ileocecal valve and appendiceal orifice were clearly viewed.  The prep

was fair.   The scope was removed with visualization of each mucosal fold.  

Scattered diverticulosis was encountered.  No adenomatous colonic polyps were 

found.  No evidence of focal colitis was found. Retroflexion of the scope 

demonstrated no internal hemorrhoids. The colon was desufflated. The patient had

tolerated the procedure well. 





Withdrawal time was over 6 minutes.





FINDINGS:


[place] preparation quality scale 3 (1-5)


No internal hemorrhoids


Prostate unremarkable.


No external prolapsed hemorrhoids.


No arteriovenous malformations.


No adenomatous polyps.


No focal colitis.





RECOMMENDATIONS:


Lower endoscopy in 10 years, 2029 or Cologaurd





Plan - Discharge Summary


Discharge Rx Participant: No


New Discharge Prescriptions: 


No Action


   Citalopram Hydrobromide [Citalopram HBr] 40 mg PO DAILY


   Cholecalciferol (Vitamin D3) [Vitamin D3] 2,000 unit PO DAILY


   Lisinopril [Zestril] 20 mg PO BID


   Omeprazole 40 mg PO DAILY


   Clopidogrel [Plavix] 75 mg PO HS


   Atorvastatin [Lipitor] 10 mg PO HS


   Aspirin 325 mg PO DAILY


   Omega-3 Fatty Acids/Fish Oil [Fish Oil 1,000 mg Softgel] 1 each PO DAILY


Discharge Medication List





Aspirin 325 mg PO DAILY 02/28/18 [History]


Atorvastatin [Lipitor] 10 mg PO HS 02/28/18 [History]


Cholecalciferol (Vitamin D3) [Vitamin D3] 2,000 unit PO DAILY 02/28/18 [History]


Citalopram Hydrobromide [Citalopram HBr] 40 mg PO DAILY 02/28/18 [History]


Clopidogrel [Plavix] 75 mg PO HS 02/28/18 [History]


Lisinopril [Zestril] 20 mg PO BID 02/28/18 [History]


Omeprazole 40 mg PO DAILY 02/28/18 [History]


Omega-3 Fatty Acids/Fish Oil [Fish Oil 1,000 mg Softgel] 1 each PO DAILY 

04/09/19 [History]








Follow up Appointment(s)/Referral(s): 


Brenda Blair MD [STAFF PHYSICIAN] - As Needed


Patient Instructions/Handouts:  *[facility] - (Anesthesia) Endoscopy Discharge 

Instructions, Diverticulosis (ED), Diverticulosis Diet (GEN), Colonoscopy (DC)


Discharge Disposition: HOME SELF-CARE

## 2019-04-12 NOTE — P.GSHP
History of Present Illness


H&P Date: 04/12/19














CHIEF COMPLAINT: Colon screen





HISTORY OF PRESENT ILLNESS: The patient is a 64-year-old male who


presents for colon screen.  Lower endoscopy was offered for further evaluation 

and management.





PAST MEDICAL HISTORY: 


Please see list.





PAST SURGICAL HISTORY: 


Please see list.





MEDICATIONS: 


Please see list.





ALLERGIES:  Please see list. 





SOCIAL HISTORY: No illicit drug use





FAMILY HISTORY: No reports of Crohn disease or ulcerative colitis. 





REVIEW OF ORGAN SYSTEMS: 


CONSTITUTIONAL: No reports of fevers or chills.  





PHYSICAL EXAM: 


VITAL SIGNS:  Stable


GENERAL: Well-developed pleasant in no acute distress. 


HEENT: No scleral icterus. Extraocular movements grossly


intact. Moist buccal mucosa. 


NECK: Supple without lymphadenopathy. 


CHEST: Unlabored respirations. Equal bilateral excursions. 


CARDIOVASCULAR: Regular rate and rhythm. Distal 2+ pulses. 


ABDOMEN: Soft, nontender, nondistended. 


MUSCULOSKELETAL: No clubbing, cyanosis, or edema. 





ASSESSMENT: 


1.  Colon screen.





PLAN: 


1. Recommend proceeding with a lower endoscopy





Past Medical History


Past Medical History: Cancer, CVA/TIA, Hyperlipidemia, Hypertension


Additional Past Medical History / Comment(s): "rt carotid artery 100% blocked", 

past "stroke"  some mild short term memory problems", throat polyps removed pt 

stated were cancerous, ANEURYSM  ("CHEST AREA")- DR WATCHING IT"


History of Any Multi-Drug Resistant Organisms: None Reported


Past Surgical History: Back Surgery, Cholecystectomy, Heart Catheterization, 

Hernia Repair


Additional Past Surgical History / Comment(s): carolynn inguinal hernia repair, 

throat polpys removed


Past Anesthesia/Blood Transfusion Reactions: Previous Problems w/ Anesthesia, 

Motion Sickness


Additional Past Anesthesia/Blood Transfusion Reaction / Comment(s): STATES TAKES

LONGER TO WAKE UP"


Smoking Status: Former smoker





- Past Family History


  ** Mother


Family Medical History: CVA/TIA





  ** Father


Family Medical History: CVA/TIA, Myocardial Infarction (MI)





Medications and Allergies


                                Home Medications











 Medication  Instructions  Recorded  Confirmed  Type


 


Aspirin 325 mg PO DAILY 02/28/18 04/09/19 History


 


Atorvastatin [Lipitor] 10 mg PO HS 02/28/18 04/09/19 History


 


Cholecalciferol (Vitamin D3) 2,000 unit PO DAILY 02/28/18 04/09/19 History





[Vitamin D3]    


 


Citalopram Hydrobromide 40 mg PO DAILY 02/28/18 04/09/19 History





[Citalopram HBr]    


 


Clopidogrel [Plavix] 75 mg PO HS 02/28/18 04/09/19 History


 


Lisinopril [Zestril] 20 mg PO BID 02/28/18 04/09/19 History


 


Omeprazole 40 mg PO DAILY 02/28/18 04/09/19 History


 


Omega-3 Fatty Acids/Fish Oil [Fish 1 each PO DAILY 04/09/19 04/09/19 History





Oil 1,000 mg Softgel]    








                                    Allergies











Allergy/AdvReac Type Severity Reaction Status Date / Time


 


No Known Allergies Allergy   Verified 04/09/19 16:15

## 2019-08-14 ENCOUNTER — HOSPITAL ENCOUNTER (OUTPATIENT)
Dept: HOSPITAL 47 - LABWHC1 | Age: 64
Discharge: HOME | End: 2019-08-14
Attending: PSYCHIATRY & NEUROLOGY
Payer: COMMERCIAL

## 2019-08-14 DIAGNOSIS — R41.3: Primary | ICD-10-CM

## 2019-08-14 DIAGNOSIS — Z86.73: ICD-10-CM

## 2019-08-14 LAB
CALCIUM SPEC-MCNC: 9.3 MG/DL (ref 8.7–10.3)
T4 FREE SERPL-MCNC: 0.8 NG/DL (ref 0.8–1.8)

## 2019-08-14 PROCEDURE — 85652 RBC SED RATE AUTOMATED: CPT

## 2019-08-14 PROCEDURE — 84481 FREE ASSAY (FT-3): CPT

## 2019-08-14 PROCEDURE — 84443 ASSAY THYROID STIM HORMONE: CPT

## 2019-08-14 PROCEDURE — 84439 ASSAY OF FREE THYROXINE: CPT

## 2019-08-14 PROCEDURE — 82306 VITAMIN D 25 HYDROXY: CPT

## 2019-08-14 PROCEDURE — 82310 ASSAY OF CALCIUM: CPT

## 2019-08-14 PROCEDURE — 86140 C-REACTIVE PROTEIN: CPT

## 2019-08-14 PROCEDURE — 36415 COLL VENOUS BLD VENIPUNCTURE: CPT

## 2019-11-02 ENCOUNTER — HOSPITAL ENCOUNTER (OUTPATIENT)
Dept: HOSPITAL 47 - RADCTMAIN | Age: 64
Discharge: HOME | End: 2019-11-02
Attending: PSYCHIATRY & NEUROLOGY
Payer: COMMERCIAL

## 2019-11-02 DIAGNOSIS — R42: ICD-10-CM

## 2019-11-02 DIAGNOSIS — R51: Primary | ICD-10-CM

## 2019-11-02 PROCEDURE — 70450 CT HEAD/BRAIN W/O DYE: CPT

## 2019-11-02 NOTE — CT
EXAMINATION TYPE: CT brain wo con

 

DATE OF EXAM: 11/2/2019

 

COMPARISON: 5/12/2017

 

HISTORY: Dizziness and headache without injury

 

CT DLP: 1047.1 mGycm

Automated exposure control for dose reduction was used.

 

FINDINGS: 

There is 5 x 2 cm area of white matter hypodensity right posterior frontal lobe. There is no mass eff
ect nor midline shift. There is no sign of intracranial hemorrhage. There is some cerebral cortical a
trophy. Calvarium is intact.

 

IMPRESSION: 

OLD RIGHT POSTERIOR FRONTAL LOBE WHITE MATTER INFARCT UNCHANGED COMPARED TO OLD EXAM. NO ACUTE INTRAC
RANIAL ABNORMALITY.

## 2019-11-25 ENCOUNTER — HOSPITAL ENCOUNTER (EMERGENCY)
Dept: HOSPITAL 47 - EC | Age: 64
Discharge: HOME | End: 2019-11-25
Payer: COMMERCIAL

## 2019-11-25 VITALS — DIASTOLIC BLOOD PRESSURE: 94 MMHG | HEART RATE: 82 BPM | RESPIRATION RATE: 18 BRPM | SYSTOLIC BLOOD PRESSURE: 169 MMHG

## 2019-11-25 VITALS — TEMPERATURE: 98.2 F

## 2019-11-25 DIAGNOSIS — Z79.02: ICD-10-CM

## 2019-11-25 DIAGNOSIS — Z87.891: ICD-10-CM

## 2019-11-25 DIAGNOSIS — M62.838: ICD-10-CM

## 2019-11-25 DIAGNOSIS — Z86.73: ICD-10-CM

## 2019-11-25 DIAGNOSIS — Z79.82: ICD-10-CM

## 2019-11-25 DIAGNOSIS — R07.9: Primary | ICD-10-CM

## 2019-11-25 DIAGNOSIS — I10: ICD-10-CM

## 2019-11-25 DIAGNOSIS — E78.5: ICD-10-CM

## 2019-11-25 DIAGNOSIS — S13.9XXA: ICD-10-CM

## 2019-11-25 DIAGNOSIS — Z79.899: ICD-10-CM
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
## 2019-11-25 LAB
ALBUMIN SERPL-MCNC: 4.5 G/DL (ref 3.5–5)
ALP SERPL-CCNC: 84 U/L (ref 38–126)
ALT SERPL-CCNC: 46 U/L (ref 21–72)
ANION GAP SERPL CALC-SCNC: 12 MMOL/L
APTT BLD: 25.9 SEC (ref 22–30)
AST SERPL-CCNC: 33 U/L (ref 17–59)
BASOPHILS # BLD AUTO: 0 K/UL (ref 0–0.2)
BASOPHILS NFR BLD AUTO: 1 %
BUN SERPL-SCNC: 16 MG/DL (ref 9–20)
CALCIUM SPEC-MCNC: 9.2 MG/DL (ref 8.4–10.2)
CHLORIDE SERPL-SCNC: 108 MMOL/L (ref 98–107)
CK SERPL-CCNC: 310 U/L (ref 55–170)
CO2 SERPL-SCNC: 22 MMOL/L (ref 22–30)
EOSINOPHIL # BLD AUTO: 0.3 K/UL (ref 0–0.7)
EOSINOPHIL NFR BLD AUTO: 7 %
ERYTHROCYTE [DISTWIDTH] IN BLOOD BY AUTOMATED COUNT: 4.24 M/UL (ref 4.3–5.9)
ERYTHROCYTE [DISTWIDTH] IN BLOOD: 13.4 % (ref 11.5–15.5)
GLUCOSE SERPL-MCNC: 174 MG/DL (ref 74–99)
HCT VFR BLD AUTO: 38.3 % (ref 39–53)
HGB BLD-MCNC: 13.1 GM/DL (ref 13–17.5)
INR PPP: 0.9 (ref ?–1.2)
LYMPHOCYTES # SPEC AUTO: 1.6 K/UL (ref 1–4.8)
LYMPHOCYTES NFR SPEC AUTO: 32 %
MAGNESIUM SPEC-SCNC: 1.9 MG/DL (ref 1.6–2.3)
MCH RBC QN AUTO: 30.8 PG (ref 25–35)
MCHC RBC AUTO-ENTMCNC: 34.1 G/DL (ref 31–37)
MCV RBC AUTO: 90.3 FL (ref 80–100)
MONOCYTES # BLD AUTO: 0.3 K/UL (ref 0–1)
MONOCYTES NFR BLD AUTO: 7 %
NEUTROPHILS # BLD AUTO: 2.5 K/UL (ref 1.3–7.7)
NEUTROPHILS NFR BLD AUTO: 52 %
PH UR: 5.5 [PH] (ref 5–8)
PLATELET # BLD AUTO: 191 K/UL (ref 150–450)
POTASSIUM SERPL-SCNC: 3.8 MMOL/L (ref 3.5–5.1)
PROT SERPL-MCNC: 7.4 G/DL (ref 6.3–8.2)
PT BLD: 9.8 SEC (ref 9–12)
SODIUM SERPL-SCNC: 142 MMOL/L (ref 137–145)
SP GR UR: 1.02 (ref 1–1.03)
UROBILINOGEN UR QL STRIP: <2 MG/DL (ref ?–2)
WBC # BLD AUTO: 4.9 K/UL (ref 3.8–10.6)

## 2019-11-25 PROCEDURE — 36415 COLL VENOUS BLD VENIPUNCTURE: CPT

## 2019-11-25 PROCEDURE — 83605 ASSAY OF LACTIC ACID: CPT

## 2019-11-25 PROCEDURE — 82550 ASSAY OF CK (CPK): CPT

## 2019-11-25 PROCEDURE — 84100 ASSAY OF PHOSPHORUS: CPT

## 2019-11-25 PROCEDURE — 71046 X-RAY EXAM CHEST 2 VIEWS: CPT

## 2019-11-25 PROCEDURE — 85610 PROTHROMBIN TIME: CPT

## 2019-11-25 PROCEDURE — 85025 COMPLETE CBC W/AUTO DIFF WBC: CPT

## 2019-11-25 PROCEDURE — 96361 HYDRATE IV INFUSION ADD-ON: CPT

## 2019-11-25 PROCEDURE — 83880 ASSAY OF NATRIURETIC PEPTIDE: CPT

## 2019-11-25 PROCEDURE — 93005 ELECTROCARDIOGRAM TRACING: CPT

## 2019-11-25 PROCEDURE — 86140 C-REACTIVE PROTEIN: CPT

## 2019-11-25 PROCEDURE — 87040 BLOOD CULTURE FOR BACTERIA: CPT

## 2019-11-25 PROCEDURE — 80053 COMPREHEN METABOLIC PANEL: CPT

## 2019-11-25 PROCEDURE — 85730 THROMBOPLASTIN TIME PARTIAL: CPT

## 2019-11-25 PROCEDURE — 99285 EMERGENCY DEPT VISIT HI MDM: CPT

## 2019-11-25 PROCEDURE — 81003 URINALYSIS AUTO W/O SCOPE: CPT

## 2019-11-25 PROCEDURE — 96375 TX/PRO/DX INJ NEW DRUG ADDON: CPT

## 2019-11-25 PROCEDURE — 84484 ASSAY OF TROPONIN QUANT: CPT

## 2019-11-25 PROCEDURE — 96374 THER/PROPH/DIAG INJ IV PUSH: CPT

## 2019-11-25 PROCEDURE — 83735 ASSAY OF MAGNESIUM: CPT

## 2019-11-25 NOTE — ED
Chest Pain HPI





- General


Chief Complaint: Chest Pain


Stated Complaint: lt arm pain


Time Seen by Provider: 11/25/19 16:15


Source: patient, RN notes reviewed, old records reviewed


Mode of arrival: ambulatory


Limitations: no limitations





- History of Present Illness


Initial Comments: 





This 64-year-old male here for suture diversion multiple complaints complaints 

of headache and neck pain, chest pain and pain that radiates the chest left arm 

and back.  Patient does have history of heart disease.  Was noticed to have low-

grade fever.  No trauma.  No significant neurological complaints.  No 

significant headache currently.


MD Complaint: chest pain, other (patient w neck and L arm pain, history of same)


-: hour(s)


Onset: during exertion, other (also complaining of fever)


Pain Location: substernal, left chest


Pain Radiation: LUE


Severity: moderate


Severity scale (1-10): 4


Quality: tightness, heaviness


Consistency: constant


Improves With: nothing


Worsens With: nothing


Context: recent illness


Anginal Symptoms: nausea, vomiting


Other Symptoms: palpitations


Treatments Prior to Arrival: none





- Related Data


                                Home Medications











 Medication  Instructions  Recorded  Confirmed


 


Aspirin 325 mg PO DAILY 02/28/18 11/25/19


 


Atorvastatin [Lipitor] 10 mg PO HS 02/28/18 11/25/19


 


Cholecalciferol (Vitamin D3) 2,000 unit PO DAILY 02/28/18 11/25/19





[Vitamin D3]   


 


Citalopram Hydrobromide 40 mg PO DAILY 02/28/18 11/25/19





[Citalopram HBr]   


 


Clopidogrel [Plavix] 75 mg PO HS 02/28/18 11/25/19


 


Lisinopril [Zestril] 20 mg PO BID 02/28/18 11/25/19


 


Omeprazole 40 mg PO DAILY 02/28/18 11/25/19


 


Omega-3 Fatty Acids/Fish Oil [Fish 1 cap PO DAILY 04/09/19 11/25/19





Oil 1,000 mg Softgel]   


 


Hydrochlorothiazide 12.5 mg PO DAILY 11/25/19 11/25/19


 


Metoprolol Tartrate [Lopressor] 100 mg PO DAILY 11/25/19 11/25/19











                                    Allergies











Allergy/AdvReac Type Severity Reaction Status Date / Time


 


No Known Allergies Allergy   Verified 11/25/19 16:54














Review of Systems


ROS Statement: 


Those systems with pertinent positive or pertinent negative responses have been 

documented in the HPI.





ROS Other: All systems not noted in ROS Statement are negative.





EKG Findings





- EKG Comments:


EKG Findings:: EKG shows sinus tachycardia rate 106   QTc 472





Past Medical History


Past Medical History: Cancer, CVA/TIA, Hyperlipidemia, Hypertension


Additional Past Medical History / Comment(s): "rt carotid artery 100% blocked", 

 past "stroke"  some mild short term memory problems", throat polyps removed pt 

stated were cancerous, ANEURYSM  ("CHEST AREA")- DR WATCHING IT"


History of Any Multi-Drug Resistant Organisms: None Reported


Past Surgical History: Back Surgery, Cholecystectomy, Heart Catheterization, 

Hernia Repair


Additional Past Surgical History / Comment(s): carolynn inguinal hernia repair, 

throat polpys removed


Past Anesthesia/Blood Transfusion Reactions: Previous Problems w/ Anesthesia, 

Motion Sickness


Additional Past Anesthesia/Blood Transfusion Reaction / Comment(s): STATES TAKES

 LONGER TO WAKE UP"


Past Psychological History: Depression


Smoking Status: Former smoker





- Past Family History


  ** Mother


Family Medical History: CVA/TIA





  ** Father


Family Medical History: CVA/TIA, Myocardial Infarction (MI)





General Exam


Limitations: no limitations


General appearance: alert, in no apparent distress


Head exam: Present: atraumatic, normocephalic, normal inspection


Eye exam: Present: normal appearance, PERRL, EOMI.  Absent: scleral icterus, 

conjunctival injection, periorbital swelling


ENT exam: Present: normal exam, mucous membranes moist


Neck exam: Present: normal inspection.  Absent: tenderness, meningismus, 

lymphadenopathy


Respiratory exam: Present: normal lung sounds bilaterally.  Absent: respiratory 

distress, wheezes, rales, rhonchi, stridor


Cardiovascular Exam: Present: regular rate, normal rhythm, normal heart sounds. 

 Absent: systolic murmur, diastolic murmur, rubs, gallop, clicks


GI/Abdominal exam: Present: soft, normal bowel sounds.  Absent: distended, 

tenderness, guarding, rebound, rigid


Extremities exam: Present: normal inspection, full ROM, normal capillary refill.

  Absent: tenderness, pedal edema, joint swelling, calf tenderness


Back exam: Present: normal inspection


Neurological exam: Present: alert, oriented X3, CN II-XII intact


Psychiatric exam: Present: normal affect, normal mood


Skin exam: Present: warm, dry, intact, normal color.  Absent: rash





Course


                                   Vital Signs











  11/25/19 11/25/19 11/25/19





  16:07 17:09 18:06


 


Temperature 98.2 F  98.2 F


 


Pulse Rate 89  89


 


Pulse Rate [  88 





Cardiac Monitor   





]   


 


Respiratory 20  20





Rate   


 


Blood Pressure 193/102  193/102


 


O2 Sat by Pulse 98  98





Oximetry   














  11/25/19





  19:11


 


Temperature 


 


Pulse Rate 85


 


Pulse Rate [ 





Cardiac Monitor 





] 


 


Respiratory 16





Rate 


 


Blood Pressure 167/94


 


O2 Sat by Pulse 96





Oximetry 














- Reevaluation(s)


Reevaluation #1: 





11/25/19 17:07


medical record is reviewed


Reevaluation #2: 





11/25/19 19:49


patient still with chest pain





Chest Pain MDM





- MDM





64 demale to the ED co chest pain woreening for chest pain observation history 

of heart disease.  Patient 1 week episodic and continuing to get worse





Disposition


Clinical Impression: 


 Chest pain





Disposition: ADMITTED AS IP TO THIS HOSP


Condition: Undetermined


Is patient prescribed a controlled substance at d/c from ED?: No

## 2019-11-25 NOTE — XR
EXAMINATION TYPE: XR chest 2V

 

DATE OF EXAM: 11/25/2019

 

COMPARISON: 2/28/2018

 

HISTORY: Weakness

 

TECHNIQUE:  Frontal and lateral views of the chest are obtained.

 

FINDINGS:  Heart and mediastinum are within normal limits. Lungs are clear. Costophrenic angles are c
lear. Diaphragm is normal. Thoracic aorta is atheromatous. There are chest leads.

 

IMPRESSION:  No active cardiopulmonary disease. Normal heart. No change.

## 2019-11-25 NOTE — ED
Medical Decision Making





- Medical Decision Making





64 male to the ED co chest pain and neck pain, patient states he had a clean 

heart catherization in the last year, he is more concerned about his neck pain 

today and would like to be discharged home.





- Lab Data


Result diagrams: 


                                 11/25/19 16:02





                                 11/25/19 16:02





                                   Lab Results











  11/25/19 11/25/19 11/25/19 Range/Units





  16:02 16:02 16:02 


 


WBC   4.9   (3.8-10.6)  k/uL


 


RBC   4.24 L   (4.30-5.90)  m/uL


 


Hgb   13.1   (13.0-17.5)  gm/dL


 


Hct   38.3 L   (39.0-53.0)  %


 


MCV   90.3   (80.0-100.0)  fL


 


MCH   30.8   (25.0-35.0)  pg


 


MCHC   34.1   (31.0-37.0)  g/dL


 


RDW   13.4   (11.5-15.5)  %


 


Plt Count   191   (150-450)  k/uL


 


Neutrophils %   52   %


 


Lymphocytes %   32   %


 


Monocytes %   7   %


 


Eosinophils %   7   %


 


Basophils %   1   %


 


Neutrophils #   2.5   (1.3-7.7)  k/uL


 


Lymphocytes #   1.6   (1.0-4.8)  k/uL


 


Monocytes #   0.3   (0-1.0)  k/uL


 


Eosinophils #   0.3   (0-0.7)  k/uL


 


Basophils #   0.0   (0-0.2)  k/uL


 


PT     (9.0-12.0)  sec


 


INR     (<1.2)  


 


APTT     (22.0-30.0)  sec


 


Sodium  142    (137-145)  mmol/L


 


Potassium  3.8    (3.5-5.1)  mmol/L


 


Chloride  108 H    ()  mmol/L


 


Carbon Dioxide  22    (22-30)  mmol/L


 


Anion Gap  12    mmol/L


 


BUN  16    (9-20)  mg/dL


 


Creatinine  0.96    (0.66-1.25)  mg/dL


 


Est GFR (CKD-EPI)AfAm  >90    (>60 ml/min/1.73 sqM)  


 


Est GFR (CKD-EPI)NonAf  84    (>60 ml/min/1.73 sqM)  


 


Glucose  174 H    (74-99)  mg/dL


 


Plasma Lactic Acid Sherif    1.4  (0.7-2.0)  mmol/L


 


Calcium  9.2    (8.4-10.2)  mg/dL


 


Phosphorus  2.9    (2.5-4.5)  mg/dL


 


Magnesium  1.9    (1.6-2.3)  mg/dL


 


Total Bilirubin  0.5    (0.2-1.3)  mg/dL


 


AST  33    (17-59)  U/L


 


ALT  46    (21-72)  U/L


 


Alkaline Phosphatase  84    ()  U/L


 


Creatine Kinase  310 H    ()  U/L


 


Troponin I     (0.000-0.034)  ng/mL


 


C-Reactive Protein  23.7 H    (<10.0)  mg/L


 


NT-Pro-B Natriuret Pep     pg/mL


 


Total Protein  7.4    (6.3-8.2)  g/dL


 


Albumin  4.5    (3.5-5.0)  g/dL


 


Urine Color     


 


Urine Appearance     (Clear)  


 


Urine pH     (5.0-8.0)  


 


Ur Specific Gravity     (1.001-1.035)  


 


Urine Protein     (Negative)  


 


Urine Glucose (UA)     (Negative)  


 


Urine Ketones     (Negative)  


 


Urine Blood     (Negative)  


 


Urine Nitrite     (Negative)  


 


Urine Bilirubin     (Negative)  


 


Urine Urobilinogen     (<2.0)  mg/dL


 


Ur Leukocyte Esterase     (Negative)  














  11/25/19 11/25/19 11/25/19 Range/Units





  16:02 16:02 16:02 


 


WBC     (3.8-10.6)  k/uL


 


RBC     (4.30-5.90)  m/uL


 


Hgb     (13.0-17.5)  gm/dL


 


Hct     (39.0-53.0)  %


 


MCV     (80.0-100.0)  fL


 


MCH     (25.0-35.0)  pg


 


MCHC     (31.0-37.0)  g/dL


 


RDW     (11.5-15.5)  %


 


Plt Count     (150-450)  k/uL


 


Neutrophils %     %


 


Lymphocytes %     %


 


Monocytes %     %


 


Eosinophils %     %


 


Basophils %     %


 


Neutrophils #     (1.3-7.7)  k/uL


 


Lymphocytes #     (1.0-4.8)  k/uL


 


Monocytes #     (0-1.0)  k/uL


 


Eosinophils #     (0-0.7)  k/uL


 


Basophils #     (0-0.2)  k/uL


 


PT   9.8   (9.0-12.0)  sec


 


INR   0.9   (<1.2)  


 


APTT   25.9   (22.0-30.0)  sec


 


Sodium     (137-145)  mmol/L


 


Potassium     (3.5-5.1)  mmol/L


 


Chloride     ()  mmol/L


 


Carbon Dioxide     (22-30)  mmol/L


 


Anion Gap     mmol/L


 


BUN     (9-20)  mg/dL


 


Creatinine     (0.66-1.25)  mg/dL


 


Est GFR (CKD-EPI)AfAm     (>60 ml/min/1.73 sqM)  


 


Est GFR (CKD-EPI)NonAf     (>60 ml/min/1.73 sqM)  


 


Glucose     (74-99)  mg/dL


 


Plasma Lactic Acid Sherif     (0.7-2.0)  mmol/L


 


Calcium     (8.4-10.2)  mg/dL


 


Phosphorus     (2.5-4.5)  mg/dL


 


Magnesium     (1.6-2.3)  mg/dL


 


Total Bilirubin     (0.2-1.3)  mg/dL


 


AST     (17-59)  U/L


 


ALT     (21-72)  U/L


 


Alkaline Phosphatase     ()  U/L


 


Creatine Kinase     ()  U/L


 


Troponin I    <0.012  (0.000-0.034)  ng/mL


 


C-Reactive Protein     (<10.0)  mg/L


 


NT-Pro-B Natriuret Pep  56    pg/mL


 


Total Protein     (6.3-8.2)  g/dL


 


Albumin     (3.5-5.0)  g/dL


 


Urine Color     


 


Urine Appearance     (Clear)  


 


Urine pH     (5.0-8.0)  


 


Ur Specific Gravity     (1.001-1.035)  


 


Urine Protein     (Negative)  


 


Urine Glucose (UA)     (Negative)  


 


Urine Ketones     (Negative)  


 


Urine Blood     (Negative)  


 


Urine Nitrite     (Negative)  


 


Urine Bilirubin     (Negative)  


 


Urine Urobilinogen     (<2.0)  mg/dL


 


Ur Leukocyte Esterase     (Negative)  














  11/25/19 Range/Units





  17:10 


 


WBC   (3.8-10.6)  k/uL


 


RBC   (4.30-5.90)  m/uL


 


Hgb   (13.0-17.5)  gm/dL


 


Hct   (39.0-53.0)  %


 


MCV   (80.0-100.0)  fL


 


MCH   (25.0-35.0)  pg


 


MCHC   (31.0-37.0)  g/dL


 


RDW   (11.5-15.5)  %


 


Plt Count   (150-450)  k/uL


 


Neutrophils %   %


 


Lymphocytes %   %


 


Monocytes %   %


 


Eosinophils %   %


 


Basophils %   %


 


Neutrophils #   (1.3-7.7)  k/uL


 


Lymphocytes #   (1.0-4.8)  k/uL


 


Monocytes #   (0-1.0)  k/uL


 


Eosinophils #   (0-0.7)  k/uL


 


Basophils #   (0-0.2)  k/uL


 


PT   (9.0-12.0)  sec


 


INR   (<1.2)  


 


APTT   (22.0-30.0)  sec


 


Sodium   (137-145)  mmol/L


 


Potassium   (3.5-5.1)  mmol/L


 


Chloride   ()  mmol/L


 


Carbon Dioxide   (22-30)  mmol/L


 


Anion Gap   mmol/L


 


BUN   (9-20)  mg/dL


 


Creatinine   (0.66-1.25)  mg/dL


 


Est GFR (CKD-EPI)AfAm   (>60 ml/min/1.73 sqM)  


 


Est GFR (CKD-EPI)NonAf   (>60 ml/min/1.73 sqM)  


 


Glucose   (74-99)  mg/dL


 


Plasma Lactic Acid Sherif   (0.7-2.0)  mmol/L


 


Calcium   (8.4-10.2)  mg/dL


 


Phosphorus   (2.5-4.5)  mg/dL


 


Magnesium   (1.6-2.3)  mg/dL


 


Total Bilirubin   (0.2-1.3)  mg/dL


 


AST   (17-59)  U/L


 


ALT   (21-72)  U/L


 


Alkaline Phosphatase   ()  U/L


 


Creatine Kinase   ()  U/L


 


Troponin I   (0.000-0.034)  ng/mL


 


C-Reactive Protein   (<10.0)  mg/L


 


NT-Pro-B Natriuret Pep   pg/mL


 


Total Protein   (6.3-8.2)  g/dL


 


Albumin   (3.5-5.0)  g/dL


 


Urine Color  Yellow  


 


Urine Appearance  Clear  (Clear)  


 


Urine pH  5.5  (5.0-8.0)  


 


Ur Specific Gravity  1.020  (1.001-1.035)  


 


Urine Protein  Negative  (Negative)  


 


Urine Glucose (UA)  Negative  (Negative)  


 


Urine Ketones  Negative  (Negative)  


 


Urine Blood  Negative  (Negative)  


 


Urine Nitrite  Negative  (Negative)  


 


Urine Bilirubin  Negative  (Negative)  


 


Urine Urobilinogen  <2.0  (<2.0)  mg/dL


 


Ur Leukocyte Esterase  Negative  (Negative)  














Disposition


Clinical Impression: 


 Chest pain, Neck sprain, Neck pain, Muscle spasm





Disposition: HOME SELF-CARE


Condition: Undetermined


Is patient prescribed a controlled substance at d/c from ED?: No

## 2019-11-26 ENCOUNTER — HOSPITAL ENCOUNTER (INPATIENT)
Dept: HOSPITAL 47 - 3SCARD | Age: 64
LOS: 1 days | Discharge: HOME | DRG: 313 | End: 2019-11-27
Attending: FAMILY MEDICINE | Admitting: FAMILY MEDICINE
Payer: COMMERCIAL

## 2019-11-26 DIAGNOSIS — R07.89: Primary | ICD-10-CM

## 2019-11-26 DIAGNOSIS — I10: ICD-10-CM

## 2019-11-26 DIAGNOSIS — M50.30: ICD-10-CM

## 2019-11-26 DIAGNOSIS — Z82.49: ICD-10-CM

## 2019-11-26 DIAGNOSIS — Z79.899: ICD-10-CM

## 2019-11-26 DIAGNOSIS — I35.0: ICD-10-CM

## 2019-11-26 DIAGNOSIS — Z79.02: ICD-10-CM

## 2019-11-26 DIAGNOSIS — E78.5: ICD-10-CM

## 2019-11-26 DIAGNOSIS — Z87.891: ICD-10-CM

## 2019-11-26 DIAGNOSIS — I16.0: ICD-10-CM

## 2019-11-26 DIAGNOSIS — Z79.82: ICD-10-CM

## 2019-11-26 DIAGNOSIS — F32.9: ICD-10-CM

## 2019-11-26 DIAGNOSIS — Z86.73: ICD-10-CM

## 2019-11-26 PROCEDURE — 84484 ASSAY OF TROPONIN QUANT: CPT

## 2019-11-26 PROCEDURE — 80048 BASIC METABOLIC PNL TOTAL CA: CPT

## 2019-11-26 PROCEDURE — 72050 X-RAY EXAM NECK SPINE 4/5VWS: CPT

## 2019-11-26 PROCEDURE — 93306 TTE W/DOPPLER COMPLETE: CPT

## 2019-11-26 RX ADMIN — LISINOPRIL SCH MG: 20 TABLET ORAL at 14:12

## 2019-11-26 RX ADMIN — METOPROLOL TARTRATE SCH MG: 50 TABLET, FILM COATED ORAL at 14:11

## 2019-11-26 RX ADMIN — LISINOPRIL SCH MG: 20 TABLET ORAL at 20:17

## 2019-11-26 NOTE — P.CRDCN
History of Present Illness


Consult date: 11/26/19


Requesting physician: Ariel Philip


Consult reason: hypertension


History of present illness: 


This is a 64-year-old  gentleman who follows regularly with Dr. Howard 

in the office.  He has a known history of hypertension, hyperlipidemia, CVA, 

carotid artery disease, mild aortic regurg/stenosis, he underwent a cardiac 

catheterization in September 2017 which revealed normal coronary arteries with 

no gradient across the aortic valve.  Was recent echocardiogram with Doppler 

study showed an ejection fraction of 55-60%, mild aortic regurg, mild aortic 

stenosis.  This was performed in 2018.  Patient came to the emergency room at 

around 4:00 yesterday, was complaining of some neck pain with radiation into his

shoulder, he denied any chest discomfort.  He was discharged home from the 

emergency room.  Chest x-ray was performed which did not reveal any active 

cardiopulmonary disease.  He came back to the emergency room later on in the 

day, was complaining about some neck discomfort again.  Patient was also found 

to have a significantly elevated blood pressure was admitted to the hospital for

same.  Blood pressure on arrival here 193/102, heart rate in the 70s, afebrile. 

Blood pressure at present, 178/102, 188/106, heart rate in the 80s to 90s, 94% 

on room air.  Blood cell count is normal, hemoglobin 13.1, platelet count 191.  

Sodium 142, potassium 3.8, BUN 16, creatinine 0.9.  Troponins are negative 2, 

magnesium 1.9.  Free T4 and TSH are normal.  BNP 56.  Patient's home medications

included omeprazole, metoprolol 100 daily, Antivert, Zestril 20 mg twice a day, 

Plavix 75 mg daily, Lipitor 10 mg daily, and a full aspirin.








Past Medical History


Past Medical History: Cancer, CVA/TIA, Hyperlipidemia, Hypertension


Additional Past Medical History / Comment(s): "rt carotid artery 100% blocked", 

past "stroke"  some mild short term memory problems", throat polyps removed pt 

stated were cancerous, ANEURYSM  ("CHEST AREA")- DR WATCHING IT"


History of Any Multi-Drug Resistant Organisms: None Reported


Past Surgical History: Back Surgery, Cholecystectomy, Heart Catheterization, 

Hernia Repair


Additional Past Surgical History / Comment(s): carolynn inguinal hernia repair, 

throat polpys removed


Past Anesthesia/Blood Transfusion Reactions: Previous Problems w/ Anesthesia, 

Motion Sickness


Additional Past Anesthesia/Blood Transfusion Reaction / Comment(s): STATES TAKES

LONGER TO WAKE UP"


Past Psychological History: Depression


Smoking Status: Former smoker





- Past Family History


  ** Mother


Family Medical History: CVA/TIA





  ** Father


Family Medical History: CVA/TIA, Myocardial Infarction (MI)





Medications and Allergies


                                Home Medications











 Medication  Instructions  Recorded  Confirmed  Type


 


Aspirin 325 mg PO DAILY@1000 02/28/18 11/26/19 History


 


Atorvastatin [Lipitor] 10 mg PO HS 02/28/18 11/26/19 History


 


Cholecalciferol (Vitamin D3) 2,000 unit PO DAILY@1000 02/28/18 11/26/19 History





[Vitamin D3]    


 


Citalopram Hydrobromide 40 mg PO DAILY@1000 02/28/18 11/26/19 History





[Citalopram HBr]    


 


Clopidogrel [Plavix] 75 mg PO HS 02/28/18 11/26/19 History


 


Lisinopril [Zestril] 20 mg PO BID 02/28/18 11/26/19 History


 


Omeprazole 40 mg PO DAILY@1000 02/28/18 11/26/19 History


 


Omega-3 Fatty Acids/Fish Oil [Fish 1 cap PO DAILY 04/09/19 11/26/19 History





Oil 1,000 mg Softgel]    


 


Metoprolol Tartrate [Lopressor] 100 mg PO DAILY 11/25/19 11/26/19 History


 


Ibuprofen [Motrin] 800 mg PO Q6H PRN 11/26/19 11/26/19 History


 


Meclizine [Antivert] 12.5 mg PO TID PRN 11/26/19 11/26/19 History








                                    Allergies











Allergy/AdvReac Type Severity Reaction Status Date / Time


 


hydrochlorothiazide Allergy  Rash/Hives Verified 11/26/19 14:17














Physical Exam


Vitals: 


                                   Vital Signs











  Temp Pulse Resp BP BP Pulse Ox


 


 11/26/19 13:20  98.3 F  108 H  16  178/102  188/106  94 L








                                Intake and Output











 11/26/19 11/26/19 11/26/19





 06:59 14:59 22:59


 


Intake Total  200 


 


Balance  200 


 


Intake:   


 


  Oral  200 


 


Other:   


 


  Weight  84.5 kg 











PHYSICAL EXAMINATION: 





GENERAL: 64-year-old  gentleman in no acute distress at the time of my 

examination





HEENT: Head is atraumatic, normocephalic.  Pupils equal, round.  Sclera 

anicteric. Conjunctiva are clear.  Mucous membranes of the mouth are moist.  

Neck is supple.  There is no elevated jugular venous pressure.  No carotid  

bruit is heard.





HEART EXAMINATION: S1 and S2 systolic murmur is heard in the aortic area.





CHEST EXAMINATION: Lungs are clear with mild diminished air entry at the bases





ABDOMEN:  Soft, nontender. Bowel sounds are heard. No organomegaly noted.


 


EXTREMITIES: 2+ peripheral pulses with trace to 1+ evidence of peripheral edema 

and no calf tenderness noted.





NEUROLOGIC patient is awake, alert and oriented 3 .


 


.


 











Results


                                 Cardiac Enzymes











  11/26/19 Range/Units





  14:31 


 


Troponin I  <0.012  (0.000-0.034)  ng/mL








                               Current Medications











Generic Name Dose Route Start Last Admin





  Trade Name Freq  PRN Reason Stop Dose Admin


 


Amlodipine Besylate  5 mg  11/26/19 14:15  11/26/19 14:11





  Norvasc  PO   5 mg





  DAILY OLYA   Administration


 


Lisinopril  20 mg  11/26/19 14:06  11/26/19 14:12





  Zestril  PO   20 mg





  BID OLYA   Administration


 


Metoprolol Tartrate  100 mg  11/26/19 14:15  11/26/19 14:11





  Lopressor  PO   100 mg





  DAILY OLYA   Administration








                                Intake and Output











 11/26/19 11/26/19 11/26/19





 06:59 14:59 22:59


 


Intake Total  200 


 


Balance  200 


 


Intake:   


 


  Oral  200 


 


Other:   


 


  Weight  84.5 kg 








                                 Patient Weight











 11/27/19





 06:59


 


Weight 84.5 kg














EKG Interpretations (text)


EKG shows a sinus tachycardia with incomplete right bundle branch block pattern 

no acute changes noted








Assessment and Plan


Plan: 





ASSESSMENT and plan


1.  Right neck and Shoulder  pain, atypical with most recent normal cardiac 

catheterization 9/2017. 


2. Hypertension urgency


3. Dyslipidemia


4. Carotid artery disease with totally occluded right ICA


5. History of CVA


6. Non-rheumatic aortic stenosis








Plan


We will repeat an echocardiogram with Doppler study.  Increase Norvasc to 10 mg 

daily.  Patient had been started on high jugular Dyazide in the office in August

 because of some mild peripheral edema, he states that he stopped taking that 

because he developed a rash, we will give him a one time dose of IV Lasix.  If 

blood pressure is more stable tomorrow, may consider stress test.  Patient's 

pain is atypical however according to the office note he has been experiencing 

some intermittent chest pains even though atypical in nature.








DNP note has been reviewed, I agree with a documented findings and plan of care.

  Patient was seen and examined.

## 2019-11-26 NOTE — ECHOF
Referral Reason:htn



MEASUREMENTS

--------

HEIGHT: 157.5 cm

WEIGHT: 84.4 kg

BP: 

RVIDd:   2.7 cm     (< 3.3)

IVSd:   1.6 cm     (0.6 - 1.1)

LVIDd:   2.8 cm     (3.9 - 5.3)

LVPWd:   1.6 cm     (0.6 - 1.1)

IVSs:   2.1 cm

LVIDs:   1.6 cm

LVPWs:   2.2 cm

LAESV Index (A-L):   19.46 ml/m

Ao Diam:   3.0 cm     (2.0 - 3.7)

AV Cusp:   1.6 cm     (1.5 - 2.6)

LA Diam:   3.7 cm     (2.7 - 3.8)

MV EXCURSION:   12.842 mm     (> 18.000)

MV EF SLOPE:   54 mm/s     (70 - 150)

EPSS:   0.5 cm

MV E Barney:   1.03 m/s

MV DecT:   224 ms

MV A Barney:   1.25 m/s

MV E/A Ratio:   0.83 

AV maxP.69 mmHg

AV meanP.87 mmHg

AR PHT:   338 ms

RAP:   5.00 mmHg

RVSP:   23.73 mmHg

TAPSE:   20.82 mm







FINDINGS

--------

Sinus rhythm.

This was a technically good study.

The left ventricular size is normal.   There is moderate concentric left ventricular hypertrophy.   O
verall left ventricular systolic function is normal with, an EF between 55 - 60 %.   The diastolic fi
lling pattern is normal for the age of the patient 13.26.

The right ventricle is normal in size.   The right ventricular systolic function is normal.

The left atrial size is normal.    Normal LA  size by volume 22+/-6 ml/m2.

The right atrial size is normal.

Aortic valve is trileaflet and is moderately thickened.   There is mild aortic regurgitation.   There
 is moderate aortic stenosis present.   Peak/mean gradient across the Aortic Valve is 35.69mmHg / 21.
87mmHg.

The mitral valve is normal.   The mitral valve leaflets are mildly thickened.   Mild mitral regurgita
tion is present.

The tricuspid valve appears structurally normal.   Mild tricuspid regurgitation present.   Right vent
ricular systolic pressure is normal at < 35 mmHg.

There is no pulmonic regurgitation present.

The aortic root size is normal.

IVC Not well visulized.

There is no pericardial effusion.



CONCLUSIONS

--------

1. Sinus rhythm.

2. This was a technically good study.

3. The left ventricular size is normal.

4. There is moderate concentric left ventricular hypertrophy.

5. Overall left ventricular systolic function is normal with, an EF between 55 - 60 %.

6. The diastolic filling pattern is normal for the age of the patient 13.26

7. The right ventricle is normal in size.

8. The right ventricular systolic function is normal.

9. The left atrial size is normal.

10. Normal LA size by volume 22+/-6 ml/m2.

11. The right atrial size is normal.

12. Aortic valve is trileaflet and is moderately thickened.

13. There is mild aortic regurgitation.

14. There is moderate aortic stenosis present.

15. Peak/mean gradient across the Aortic Valve is 35.69mmHg / 21.87mmHg.

16. The mitral valve is normal.

17. The mitral valve leaflets are mildly thickened.

18. Mild mitral regurgitation is present.

19. The tricuspid valve appears structurally normal.

20. Mild tricuspid regurgitation present.

21. Right ventricular systolic pressure is normal at < 35 mmHg.

22. There is no pulmonic regurgitation present.

23. The aortic root size is normal.

24. IVC Not well visulized.

25. There is no pericardial effusion.





SONOGRAPHER: Laura Fairchild RDCS

## 2019-11-27 VITALS — SYSTOLIC BLOOD PRESSURE: 123 MMHG | HEART RATE: 75 BPM | DIASTOLIC BLOOD PRESSURE: 72 MMHG

## 2019-11-27 VITALS — RESPIRATION RATE: 18 BRPM

## 2019-11-27 VITALS — TEMPERATURE: 98 F

## 2019-11-27 LAB
ANION GAP SERPL CALC-SCNC: 11 MMOL/L
BUN SERPL-SCNC: 20 MG/DL (ref 9–20)
CALCIUM SPEC-MCNC: 9.7 MG/DL (ref 8.4–10.2)
CHLORIDE SERPL-SCNC: 103 MMOL/L (ref 98–107)
CO2 SERPL-SCNC: 29 MMOL/L (ref 22–30)
GLUCOSE SERPL-MCNC: 103 MG/DL (ref 74–99)
POTASSIUM SERPL-SCNC: 4.2 MMOL/L (ref 3.5–5.1)
SODIUM SERPL-SCNC: 143 MMOL/L (ref 137–145)

## 2019-11-27 RX ADMIN — METOPROLOL TARTRATE SCH MG: 50 TABLET, FILM COATED ORAL at 09:51

## 2019-11-27 RX ADMIN — LISINOPRIL SCH MG: 20 TABLET ORAL at 08:51

## 2019-11-27 NOTE — P.DS
Providers


Date of admission: 


11/26/19 12:41





Expected date of discharge: 11/27/19


Attending physician: 


Ariel Philip





Consults: 





                                        





11/26/19 13:37


Consult Physician Urgent 


   Consulting Provider: Malick Gandhi


   Consult Reason/Comments: chest pain


   Do you want consulting provider notified?: Yes


   Placement Type Exists?: Yes











Primary care physician: 


Ariel Philip





Hospital Course: 





64 old male was a direct admit from the physician's office for complaints of 

chest pain. Patient also complained of neck pain. Patient was evaluated by 

cardiology echo done cleared for discharge. Scheduled for stress test in two 

weeks. We'll discuss MRI of the neck at follow up with PCP office visit.





Assessment


chest pain atypical cleared by cardiology


chronic neck pain with degenerative disc disease


hypertension urgency


dyslipidemia


carotid artery disease with toll occluded right ICAC by Dr. Nichols


history of CVA


nondramatic aortic stenosis





Plan


follow up two weeks for a stress test with cardiology


follow up with family physician regarding degenerative disc disease  neck and 

possible MRI








Plan - Discharge Summary


Discharge Rx Participant: No


New Discharge Prescriptions: 


New


   Atorvastatin [Lipitor] 40 mg PO HS #90 tab


   amLODIPine [Norvasc] 10 mg PO DAILY #90 tab


   Acetaminophen Tab [Tylenol] 650 mg PO Q6HR PRN  tab


     PRN Reason: Fever And/ Or Pain





Continue


   Citalopram Hydrobromide [Citalopram HBr] 40 mg PO DAILY@1000


   Cholecalciferol (Vitamin D3) [Vitamin D3] 2,000 unit PO DAILY@1000


   Lisinopril [Zestril] 20 mg PO BID


   Omeprazole 40 mg PO DAILY@1000


   Clopidogrel [Plavix] 75 mg PO HS


   Aspirin 325 mg PO DAILY@1000


   Omega-3 Fatty Acids/Fish Oil [Fish Oil 1,000 mg Softgel] 1 cap PO DAILY


   Metoprolol Tartrate [Lopressor] 100 mg PO DAILY


   Meclizine [Antivert] 12.5 mg PO TID PRN


     PRN Reason: DIZZINESS





Discontinued


   Atorvastatin [Lipitor] 10 mg PO HS


   Ibuprofen [Motrin] 800 mg PO Q6H PRN


     PRN Reason: Pain


Discharge Medication List





Aspirin 325 mg PO DAILY@1000 02/28/18 [History]


Cholecalciferol (Vitamin D3) [Vitamin D3] 2,000 unit PO DAILY@1000 02/28/18 

[History]


Citalopram Hydrobromide [Citalopram HBr] 40 mg PO DAILY@1000 02/28/18 [History]


Clopidogrel [Plavix] 75 mg PO HS 02/28/18 [History]


Lisinopril [Zestril] 20 mg PO BID 02/28/18 [History]


Omeprazole 40 mg PO DAILY@1000 02/28/18 [History]


Omega-3 Fatty Acids/Fish Oil [Fish Oil 1,000 mg Softgel] 1 cap PO DAILY 04/09/19

[History]


Metoprolol Tartrate [Lopressor] 100 mg PO DAILY 11/25/19 [History]


Meclizine [Antivert] 12.5 mg PO TID PRN 11/26/19 [History]


Acetaminophen Tab [Tylenol] 650 mg PO Q6HR PRN  tab 11/27/19 [Rx]


Atorvastatin [Lipitor] 40 mg PO HS #90 tab 11/27/19 [Rx]


amLODIPine [Norvasc] 10 mg PO DAILY #90 tab 11/27/19 [Rx]








Follow up Appointment(s)/Referral(s): 


Ariel Philip MD [Primary Care Provider] - 1 Week


Jack Duong MD [STAFF PHYSICIAN] - 2 Weeks


Patient Instructions/Handouts:  Hypertension (DC)


Activity/Diet/Wound Care/Special Instructions: 


follow up for stress test in office in 2 weeks 


Discharge Disposition: HOME SELF-CARE

## 2019-11-27 NOTE — PN
PROGRESS NOTE



Mr. Diane is a 64-year-old male who presented with neck and arm discomfort and elevated

blood pressure.  He has no history of obstructive coronary artery disease by cardiac

catheterization in September 2017.  He still has some neck discomfort related to

position.  He denies any chest pain.  No dizziness.  No palpitation.  He denies any

nausea.  He continues to be at this time on amlodipine 10 mg daily, aspirin once a day,

Lipitor 10 mg daily, Plavix 75 mg daily, Zestril 20 mg twice a day, metoprolol tartrate

100 mg daily.



PHYSICAL EXAMINATION:

Blood pressure between the 130s and 150s with heart rate in the 80s.

LUNGS:  Clear.

HEART:  Regular rate and rhythm. S1, S2.  No S3.  No rub.

ABDOMEN:  Soft, nontender.

EXTREMITIES:  No edema.

Neck pain discomfort reproducible by movement of the neck.



LAB DATA:

Lab data revealed BUN and creatinine 20 and 1.18.  Troponin less than 0.012.



His echocardiogram revealed a preserved left ventricular size and systolic function.



IMPRESSION:

1. Hypertension, under better control but remains elevated.

2. Neck and arm discomfort atypical for ischemic heart disease.

3. History of carotid disease.

4. Mild aortic stenosis.



RECOMMENDATION:

From the cardiac standpoint, we will continue on the present therapy.  He will follow

his blood pressure at home and depending on the trend, further adjustment will be made.

Patient had allergy from the HYDROCHLOROTHIAZIDE.  He will follow up with Dr. Duong and

depending on the trend of his blood pressure, further adjustment will be made.





MMODL / IJN: [id number] / Job#: [id number]

## 2019-11-27 NOTE — XR
Cervical spine

 

HISTORY: Neck pain

 

5 views of the cervical spine

 

There is multilevel spondylosis. Cervical vertebral bodies show preserved height. Bone mineralization
 is reduced. There is multilevel facet arthropathy. Minimal grade 1 retrolisthesis present at C6-7, t
here is associated loss of disc height. Anterolisthesis grade 1 C7-T1. There is multilevel spondylosi
s. Facet arthropathy changes are present. Dense vascular calcifications likely in the distribution of
 the carotid arteries are noted. There is some foraminal encroachment on the right at C3-4 greater th
an on the left. Some left-sided foraminal encroachment suspected at C6-7.

 

IMPRESSION: Degenerative disc disease and facet arthropathy, osteopenia.

## 2019-11-27 NOTE — P.HPIM
History of Present Illness





64-year-old male presented to family practice physician Dr. Ariel Philip with 

complaints of chest pain accompanied with dizziness and dyspnea periods patient 

sent to the emergency room evaluated and discharged home.  Patient revisited 

family physician and found to be hypertensive was then directed admission for 

evaluation of chest pain hypertension.  Patient complains of chronic neck pain





Review of Systems


Cardiovascular: Reports chest pain, Reports dyspnea on exertion


Musculoskeletal: Reports neck pain





Past Medical History


Past Medical History: Cancer, CVA/TIA, Hyperlipidemia, Hypertension


Additional Past Medical History / Comment(s): "rt carotid artery 100% blocked", 

past "stroke"  some mild short term memory problems", throat polyps removed pt 

stated were cancerous, ANEURYSM  ("CHEST AREA")- DR WATCHING IT"


History of Any Multi-Drug Resistant Organisms: None Reported


Past Surgical History: Back Surgery, Cholecystectomy, Heart Catheterization, 

Hernia Repair


Additional Past Surgical History / Comment(s): carolynn inguinal hernia repair, 

throat polpys removed


Past Anesthesia/Blood Transfusion Reactions: Previous Problems w/ Anesthesia, 

Motion Sickness


Additional Past Anesthesia/Blood Transfusion Reaction / Comment(s): STATES TAKES

LONGER TO WAKE UP"


Past Psychological History: Depression


Smoking Status: Former smoker





- Past Family History


  ** Mother


Family Medical History: CVA/TIA





  ** Father


Family Medical History: CVA/TIA, Myocardial Infarction (MI)


Additional Family Medical History / Comment(s): Father  of a MI while in his

60s or 70s





Medications and Allergies


                                Home Medications











 Medication  Instructions  Recorded  Confirmed  Type


 


Aspirin 325 mg PO DAILY@1000 18 History


 


Atorvastatin [Lipitor] 10 mg PO HS 18 History


 


Cholecalciferol (Vitamin D3) 2,000 unit PO DAILY@18 History





[Vitamin D3]    


 


Citalopram Hydrobromide 40 mg PO DAILY@1000 18 History





[Citalopram HBr]    


 


Clopidogrel [Plavix] 75 mg PO HS 18 History


 


Lisinopril [Zestril] 20 mg PO BID 18 History


 


Omeprazole 40 mg PO DAILY@1000 18 History


 


Omega-3 Fatty Acids/Fish Oil [Fish 1 cap PO DAILY 19 History





Oil 1,000 mg Softgel]    


 


Metoprolol Tartrate [Lopressor] 100 mg PO DAILY 19 History


 


Ibuprofen [Motrin] 800 mg PO Q6H PRN 19 History


 


Meclizine [Antivert] 12.5 mg PO TID PRN 19 History








                                    Allergies











Allergy/AdvReac Type Severity Reaction Status Date / Time


 


hydrochlorothiazide Allergy  Rash/Hives Verified 19 14:17














Physical Exam


Vitals: 


                                   Vital Signs











  Temp Pulse Resp BP BP Pulse Ox


 


 19 08:19  98 F  88  18  152/84   94 L


 


 19 04:00  99.4 F  87  18  174/95   95


 


 19 00:00  98.5 F  86  18  138/83   93 L


 


 19 20:00  97.8 F  88  17  131/75   95


 


 19 18:38   87   167/84  


 


 19 16:00   95  16   


 


 19 15:50  97.7 F  95  16  180/89  175/87  97


 


 19 13:20  98.3 F  108 H  16  178/102  188/106  94 L








                                Intake and Output











 19





 22:59 06:59 14:59


 


Intake Total 220  


 


Output Total 2650 1000 


 


Balance -2430 -1000 


 


Intake:   


 


  IV 20  


 


    Invasive Line 1 20  


 


  Oral 200  


 


Output:   


 


  Urine 2650 1000 


 


Other:   


 


  Voiding Method Toilet  


 


  # Voids 1 4 


 


  Weight 84.5 kg 82.2 kg 














- Constitutional


General appearance: mild distress





- EENT


Eyes: PERRLA


Ears: bilateral: normal





- Neck


Neck: normal ROM





- Respiratory


Respiratory: bilateral: CTA





- Cardiovascular


Rhythm: regular





- Gastrointestinal


General gastrointestinal: soft





- Integumentary


Integumentary: normal





- Neurologic


Neurologic: CNII-XII intact





- Musculoskeletal


Musculoskeletal: gait normal





- Psychiatric


Psychiatric: A&O x's 3, appropriate affect, intact judgment & insight





Results


CBC & Chem 7: 


                                 19 05:59


Labs: 


                  Abnormal Lab Results - Last 24 Hours (Table)











  19 Range/Units





  05:59 


 


Glucose  103 H  (74-99)  mg/dL











Chest x-ray: report reviewed





Thrombosis Risk Factor Assmnt





- Choose All That Apply


Any of the Below Risk Factors Present?: Yes


Each Factor Represents 1 point: Obesity (BMI >25)


Other Risk Factors: Yes


Each Risk Factor Represents 2 Points: Age 61-74 years, Malignancy


Other congenital or acquired thrombophilia - If yes, enter type in comment: No


Thrombosis Risk Factor Assessment Total Risk Factor Score: 5


Thrombosis Risk Factor Assessment Level: High Risk





Assessment and Plan


Plan: 





Assessment


Right neck and shoulder pain


Chest pain atypical negative troponins


Hypertension urgency


Dyslipidemia


Carotid artery disease with total occlusion a right ICA monitored by Dr. Nichols


History of CVA


Nonrheumatic aortic stenosis





Plan


Control blood pressure


Follow-up with cardiology in 2 weeks for stress tests


Neck x-ray

## 2020-11-17 ENCOUNTER — HOSPITAL ENCOUNTER (EMERGENCY)
Dept: HOSPITAL 47 - EC | Age: 65
Discharge: HOME | End: 2020-11-17
Payer: COMMERCIAL

## 2020-11-17 VITALS
DIASTOLIC BLOOD PRESSURE: 86 MMHG | RESPIRATION RATE: 16 BRPM | SYSTOLIC BLOOD PRESSURE: 121 MMHG | TEMPERATURE: 99.7 F | HEART RATE: 71 BPM

## 2020-11-17 DIAGNOSIS — Z79.899: ICD-10-CM

## 2020-11-17 DIAGNOSIS — R11.10: ICD-10-CM

## 2020-11-17 DIAGNOSIS — I71.4: ICD-10-CM

## 2020-11-17 DIAGNOSIS — Z79.82: ICD-10-CM

## 2020-11-17 DIAGNOSIS — Z79.02: ICD-10-CM

## 2020-11-17 DIAGNOSIS — I10: ICD-10-CM

## 2020-11-17 DIAGNOSIS — U07.1: Primary | ICD-10-CM

## 2020-11-17 DIAGNOSIS — Z90.49: ICD-10-CM

## 2020-11-17 DIAGNOSIS — E78.5: ICD-10-CM

## 2020-11-17 DIAGNOSIS — Z88.8: ICD-10-CM

## 2020-11-17 DIAGNOSIS — F32.9: ICD-10-CM

## 2020-11-17 DIAGNOSIS — Z86.73: ICD-10-CM

## 2020-11-17 LAB
ALBUMIN SERPL-MCNC: 4.3 G/DL (ref 3.5–5)
ALP SERPL-CCNC: 45 U/L (ref 38–126)
ALT SERPL-CCNC: 50 U/L (ref 4–49)
AMYLASE SERPL-CCNC: 74 U/L (ref 30–110)
ANION GAP SERPL CALC-SCNC: 10 MMOL/L
APTT BLD: 26.1 SEC (ref 22–30)
AST SERPL-CCNC: 72 U/L (ref 17–59)
BASOPHILS # BLD AUTO: 0 K/UL (ref 0–0.2)
BASOPHILS NFR BLD AUTO: 0 %
BUN SERPL-SCNC: 25 MG/DL (ref 9–20)
CALCIUM SPEC-MCNC: 8.5 MG/DL (ref 8.4–10.2)
CHLORIDE SERPL-SCNC: 96 MMOL/L (ref 98–107)
CO2 SERPL-SCNC: 24 MMOL/L (ref 22–30)
EOSINOPHIL # BLD AUTO: 0 K/UL (ref 0–0.7)
EOSINOPHIL NFR BLD AUTO: 1 %
ERYTHROCYTE [DISTWIDTH] IN BLOOD BY AUTOMATED COUNT: 4.32 M/UL (ref 4.3–5.9)
ERYTHROCYTE [DISTWIDTH] IN BLOOD: 14.7 % (ref 11.5–15.5)
GLUCOSE SERPL-MCNC: 113 MG/DL (ref 74–99)
HCT VFR BLD AUTO: 38.1 % (ref 39–53)
HGB BLD-MCNC: 13.1 GM/DL (ref 13–17.5)
INR PPP: 1 (ref ?–1.2)
LIPASE SERPL-CCNC: 316 U/L (ref 23–300)
LYMPHOCYTES # SPEC AUTO: 0.7 K/UL (ref 1–4.8)
LYMPHOCYTES NFR SPEC AUTO: 18 %
MCH RBC QN AUTO: 30.3 PG (ref 25–35)
MCHC RBC AUTO-ENTMCNC: 34.2 G/DL (ref 31–37)
MCV RBC AUTO: 88.3 FL (ref 80–100)
MONOCYTES # BLD AUTO: 0.2 K/UL (ref 0–1)
MONOCYTES NFR BLD AUTO: 5 %
NEUTROPHILS # BLD AUTO: 2.9 K/UL (ref 1.3–7.7)
NEUTROPHILS NFR BLD AUTO: 74 %
PLATELET # BLD AUTO: 128 K/UL (ref 150–450)
POTASSIUM SERPL-SCNC: 4.4 MMOL/L (ref 3.5–5.1)
PROT SERPL-MCNC: 7.5 G/DL (ref 6.3–8.2)
PT BLD: 10.1 SEC (ref 9–12)
SODIUM SERPL-SCNC: 130 MMOL/L (ref 137–145)
WBC # BLD AUTO: 3.9 K/UL (ref 3.8–10.6)

## 2020-11-17 PROCEDURE — 96374 THER/PROPH/DIAG INJ IV PUSH: CPT

## 2020-11-17 PROCEDURE — 96375 TX/PRO/DX INJ NEW DRUG ADDON: CPT

## 2020-11-17 PROCEDURE — 83690 ASSAY OF LIPASE: CPT

## 2020-11-17 PROCEDURE — 86140 C-REACTIVE PROTEIN: CPT

## 2020-11-17 PROCEDURE — 99284 EMERGENCY DEPT VISIT MOD MDM: CPT

## 2020-11-17 PROCEDURE — 85025 COMPLETE CBC W/AUTO DIFF WBC: CPT

## 2020-11-17 PROCEDURE — 80053 COMPREHEN METABOLIC PANEL: CPT

## 2020-11-17 PROCEDURE — 85730 THROMBOPLASTIN TIME PARTIAL: CPT

## 2020-11-17 PROCEDURE — 84484 ASSAY OF TROPONIN QUANT: CPT

## 2020-11-17 PROCEDURE — 85610 PROTHROMBIN TIME: CPT

## 2020-11-17 PROCEDURE — 74177 CT ABD & PELVIS W/CONTRAST: CPT

## 2020-11-17 PROCEDURE — 96361 HYDRATE IV INFUSION ADD-ON: CPT

## 2020-11-17 PROCEDURE — 93005 ELECTROCARDIOGRAM TRACING: CPT

## 2020-11-17 PROCEDURE — 82150 ASSAY OF AMYLASE: CPT

## 2020-11-17 PROCEDURE — 71045 X-RAY EXAM CHEST 1 VIEW: CPT

## 2020-11-17 PROCEDURE — 36415 COLL VENOUS BLD VENIPUNCTURE: CPT

## 2020-11-17 NOTE — CT
EXAMINATION TYPE: CT abdomen pelvis w con

 

DATE OF EXAM: 11/17/2020

 

COMPARISON: Chest x-ray 11/17/2020

 

HISTORY: Right sided abdominal pain

 

CT DLP: 1023.2 mGycm

Automated exposure control for dose reduction was used.

 

CONTRAST: 

CT scan of the abdomen pelvis is performed with IV Contrast, patient injected with 100 mL of Isovue 3
00.

 

FINDINGS- 

LUNG BASES-subsegmental peripheral infiltrates in the lower lobes could be seen with viral pneumoniti
s or pneumonia.. 

 

LIVER/GB-liver is diffusely low in attenuation suggestive of hepatic steatosis. Length of the liver m
easures 23 cm correlate for hepatomegaly. Early with LFTs for hepatocellular disease or hepatitis. Po
stcholecystectomy changes noted.

 

PANCREAS-  No gross abnormality is seen. 

 

SPLEEN-  No gross abnormality is seen. 

 

ADRENALS-  No gross abnormality is seen.

 

KIDNEYS/BLADDER-no hydronephrosis or nephrolithiasis. There is a simple appearing cyst involving the 
right kidney measuring 9 Hounsfield units and 2.8 cm..

   

BOWEL-bowel gas pattern nonspecific.  Normal appendix. 

  

LYMPH NODES-  No greater than 1cm abdominal or pelvic lymph nodes areappreciated. 

 

OSSEOUS STRUCTURES-hypertrophic and degenerative change of the spine.. 

 

OTHER-  there is abdominal aortic aneurysm originating below the renal arteries and extending the aor
tic bifurcation and 3 and 5.4 cm in greatest dimension. No dissection identified. No definite extensi
on into the iliac vasculature although there is mild ectasia of the right common iliac artery. Diffus
e atherosclerotic changes are seen.

Appears to be evidence of previous surgery involving the anterior abdominal wall possibly related to 
previous hernia repair surgery. 

 

Prostate is enlarged.

 

IMPRESSION- 

1. Peripheral lower lobe infiltrates correlate for pneumonia including atypical or viral pneumonitis 
such such as covid.

2. Hepatomegaly correlate for hepatocellular disease or hepatic steatosis.

3. There is a 5.4 cm infrarenal aortic aneurysm extending to the aortic bifurcation. Significant sten
osis involving the right common iliac artery suspected.

4. Prostate hypertrophy.

## 2020-11-17 NOTE — ED
General Adult HPI





- General


Chief complaint: Nausea/Vomiting/Diarrhea


Stated complaint: Vomiting


Time Seen by Provider: 20 12:10


Source: patient, family, RN notes reviewed


Mode of arrival: wheelchair


Limitations: no limitations





- History of Present Illness


Initial comments: 





patient is a pleasant 65-year-old male presenting to the emergency Department 

with complaints of nausea vomiting and abdominal discomfort.patient has had 

cough for the past couple weeks.  Cough has been minimal.  Patient has had 

chills last couple of days.  Patient was onaware that he had a fever. No 

constipation or diarrhea.  Abdominal discomfort is more right upper abdomen and 

rated as 6/10.  No radiation.atient believes he has previously had his 

gallbladder removed.





- Related Data


                                Home Medications











 Medication  Instructions  Recorded  Confirmed


 


Aspirin 325 mg PO DAILY 18


 


Cholecalciferol (Vitamin D3) 2,000 unit PO DAILY 18





[Vitamin D3]   


 


Citalopram Hydrobromide 40 mg PO DAILY 18





[Citalopram HBr]   


 


Clopidogrel [Plavix] 75 mg PO HS 18


 


Omeprazole 40 mg PO DAILY 18


 


lisinopriL [Zestril] 20 mg PO BID 18


 


Omega-3 Fatty Acids/Fish Oil [Fish 1 cap PO DAILY 19





Oil 1,000 mg Softgel]   


 


Atorvastatin Calcium [Lipitor] 10 mg PO HS 20








                                  Previous Rx's











 Medication  Instructions  Recorded


 


Acetaminophen Tab [Tylenol] 650 mg PO Q6HR PRN  tab 19


 


Ondansetron Odt [Zofran Odt] 4 mg PO Q8HR PRN #10 tab 20











                                    Allergies











Allergy/AdvReac Type Severity Reaction Status Date / Time


 


hydrochlorothiazide Allergy  Rash/Hives Verified 20 13:08














Review of Systems


ROS Statement: 


Those systems with pertinent positive or pertinent negative responses have been 

documented in the HPI.





ROS Other: All systems not noted in ROS Statement are negative.


Constitutional: Reports: chills


Eyes: Denies: eye pain


ENT: Denies: ear pain


Respiratory: Reports: cough.  Denies: dyspnea


Cardiovascular: Denies: chest pain


Endocrine: Denies: fatigue


Gastrointestinal: Reports: abdominal pain, nausea, vomiting.  Denies: diarrhea, 

constipation


Genitourinary: Denies: dysuria


Musculoskeletal: Denies: back pain


Skin: Denies: rash


Neurological: Denies: weakness





Past Medical History


Past Medical History: Cancer, CVA/TIA, Hyperlipidemia, Hypertension


Additional Past Medical History / Comment(s): "rt carotid artery 100% blocked", 

past "stroke"  some mild short term memory problems", throat polyps removed pt 

stated were cancerous, ANEURYSM  ("CHEST AREA")- DR WATCHING IT"


History of Any Multi-Drug Resistant Organisms: None Reported


Past Surgical History: Back Surgery, Cholecystectomy, Heart Catheterization, 

Hernia Repair


Additional Past Surgical History / Comment(s): carolynn inguinal hernia repair, 

throat polpys removed


Past Anesthesia/Blood Transfusion Reactions: Previous Problems w/ Anesthesia, 

Motion Sickness


Additional Past Anesthesia/Blood Transfusion Reaction / Comment(s): STATES TAKES

LONGER TO WAKE UP"


Past Psychological History: Depression


Smoking Status: Former smoker


Past Alcohol Use History: None Reported


Past Drug Use History: None Reported





- Past Family History


  ** Mother


Family Medical History: CVA/TIA





  ** Father


Family Medical History: CVA/TIA, Myocardial Infarction (MI)


Additional Family Medical History / Comment(s): Father  of a MI while in his

60s or 70s





General Exam


Limitations: no limitations


General appearance: alert, in no apparent distress


Head exam: Present: normocephalic


Eye exam: Present: normal appearance


Neck exam: Present: normal inspection


Respiratory exam: Present: normal lung sounds bilaterally


Cardiovascular Exam: Present: regular rate, normal rhythm


  ** Expanded


Peripheral pulses: 2+: Radial (R), Radial (L), Dorsalis Pedis (R), Dorsalis 

Pedis (L)


GI/Abdominal exam: Present: soft, tenderness (moderate tenderness, right upper 

quadrant), normal bowel sounds.  Absent: distended, guarding, rebound, rigid, 

pulsatile mass


Extremities exam: Present: normal inspection.  Absent: pedal edema, calf ten

derness


Neurological exam: Present: alert


Psychiatric exam: Present: normal affect, normal mood


Skin exam: Present: normal color





Course


                                   Vital Signs











  20





  12:03


 


Temperature 101.1 F H


 


Pulse Rate 98


 


Respiratory 20





Rate 


 


Blood Pressure 133/86


 


O2 Sat by Pulse 95





Oximetry 














EKG Findings





- EKG Comments:


EKG Findings:: ormal sinus rhythm 97.  .  .  .  .  

Left axis.  Right bundle branch block.  No acute ST change.





Medical Decision Making





- Medical Decision Making





atient reevaluated and resting comfortably in bed.  Patient is feeling much 

better and does request discharge home.  Patient is updated on results.  Patient

is made aware of growth of his aortic aneurysm.  Patient states he is switching 

to see Dr. Duong for this.  Case was discussed with Dr. Duong who is familiar with

this patient and okay with discharge however will need to follow-up with the 

patient.  Case was also discussed with Tyler, covering with Dr. Philip.  He is 

made aware ofvital signs and lab work as well as chest x-ray.  Patient does have

very high suspicion for code.  Patient has hadcough that is been mild for 2 

weeks.  Patient denies any dyspnea.  Patient is doing well clinically and is 

stable for discharge.





- Lab Data


Result diagrams: 


                                 20 12:31





                                 20 12:31


                                   Lab Results











  20 Range/Units





  12:31 12:31 12:31 


 


WBC  3.9    (3.8-10.6)  k/uL


 


RBC  4.32    (4.30-5.90)  m/uL


 


Hgb  13.1    (13.0-17.5)  gm/dL


 


Hct  38.1 L    (39.0-53.0)  %


 


MCV  88.3    (80.0-100.0)  fL


 


MCH  30.3    (25.0-35.0)  pg


 


MCHC  34.2    (31.0-37.0)  g/dL


 


RDW  14.7    (11.5-15.5)  %


 


Plt Count  128 L    (150-450)  k/uL


 


MPV  8.6    


 


Neutrophils %  74    %


 


Lymphocytes %  18    %


 


Monocytes %  5    %


 


Eosinophils %  1    %


 


Basophils %  0    %


 


Neutrophils #  2.9    (1.3-7.7)  k/uL


 


Lymphocytes #  0.7 L    (1.0-4.8)  k/uL


 


Monocytes #  0.2    (0-1.0)  k/uL


 


Eosinophils #  0.0    (0-0.7)  k/uL


 


Basophils #  0.0    (0-0.2)  k/uL


 


PT   10.1   (9.0-12.0)  sec


 


INR   1.0   (<1.2)  


 


APTT   26.1   (22.0-30.0)  sec


 


Sodium    130 L  (137-145)  mmol/L


 


Potassium    4.4  (3.5-5.1)  mmol/L


 


Chloride    96 L  ()  mmol/L


 


Carbon Dioxide    24  (22-30)  mmol/L


 


Anion Gap    10  mmol/L


 


BUN    25 H  (9-20)  mg/dL


 


Creatinine    1.41 H  (0.66-1.25)  mg/dL


 


Est GFR (CKD-EPI)AfAm    61  (>60 ml/min/1.73 sqM)  


 


Est GFR (CKD-EPI)NonAf    53  (>60 ml/min/1.73 sqM)  


 


Glucose    113 H  (74-99)  mg/dL


 


Calcium    8.5  (8.4-10.2)  mg/dL


 


Total Bilirubin    1.0  (0.2-1.3)  mg/dL


 


AST    72 H  (17-59)  U/L


 


ALT    50 H  (4-49)  U/L


 


Alkaline Phosphatase    45  ()  U/L


 


Troponin I     (0.000-0.034)  ng/mL


 


C-Reactive Protein    65.6 H  (<10.0)  mg/L


 


Total Protein    7.5  (6.3-8.2)  g/dL


 


Albumin    4.3  (3.5-5.0)  g/dL


 


Amylase    74  ()  U/L


 


Lipase    316 H  ()  U/L














  20 Range/Units





  12:31 


 


WBC   (3.8-10.6)  k/uL


 


RBC   (4.30-5.90)  m/uL


 


Hgb   (13.0-17.5)  gm/dL


 


Hct   (39.0-53.0)  %


 


MCV   (80.0-100.0)  fL


 


MCH   (25.0-35.0)  pg


 


MCHC   (31.0-37.0)  g/dL


 


RDW   (11.5-15.5)  %


 


Plt Count   (150-450)  k/uL


 


MPV   


 


Neutrophils %   %


 


Lymphocytes %   %


 


Monocytes %   %


 


Eosinophils %   %


 


Basophils %   %


 


Neutrophils #   (1.3-7.7)  k/uL


 


Lymphocytes #   (1.0-4.8)  k/uL


 


Monocytes #   (0-1.0)  k/uL


 


Eosinophils #   (0-0.7)  k/uL


 


Basophils #   (0-0.2)  k/uL


 


PT   (9.0-12.0)  sec


 


INR   (<1.2)  


 


APTT   (22.0-30.0)  sec


 


Sodium   (137-145)  mmol/L


 


Potassium   (3.5-5.1)  mmol/L


 


Chloride   ()  mmol/L


 


Carbon Dioxide   (22-30)  mmol/L


 


Anion Gap   mmol/L


 


BUN   (9-20)  mg/dL


 


Creatinine   (0.66-1.25)  mg/dL


 


Est GFR (CKD-EPI)AfAm   (>60 ml/min/1.73 sqM)  


 


Est GFR (CKD-EPI)NonAf   (>60 ml/min/1.73 sqM)  


 


Glucose   (74-99)  mg/dL


 


Calcium   (8.4-10.2)  mg/dL


 


Total Bilirubin   (0.2-1.3)  mg/dL


 


AST   (17-59)  U/L


 


ALT   (4-49)  U/L


 


Alkaline Phosphatase   ()  U/L


 


Troponin I  <0.012  (0.000-0.034)  ng/mL


 


C-Reactive Protein   (<10.0)  mg/L


 


Total Protein   (6.3-8.2)  g/dL


 


Albumin   (3.5-5.0)  g/dL


 


Amylase   ()  U/L


 


Lipase   ()  U/L














- Radiology Data


Radiology results: report reviewed (computed tomography scan abdomen pelvis 

shows peripheral lower lobe infiltrates, suspect atypical such as cold bed.  

Hepatomegaly.  5.4 cm infrarenal aortic aneurysm.  Discussed with radiologist.),

image reviewed (chest x-ray shows bilateral infiltrates)





Disposition


Clinical Impression: 


 Vomiting, Abdominal pain, COVID-19, Abdominal aortic aneurysm





Disposition: HOME SELF-CARE


Condition: Stable


Instructions (If sedation given, give patient instructions):  Abdominal Pain 

(ED), Acute Nausea and Vomiting (ED)


Additional Instructions: 


please follow-up both with Dr. Philip and Dr. Duong this week.  Return for in

creased abdominal discomfort, uncontrolled vomiting, not tolerating fluids, 

difficulty breathing, worsening symptoms or any other concerns.  

Over-the-counter Tylenol as needed for fever or chills or muscle aches.  

[place]  

Over-the-counter vitamin C, vitamin D, and zinc


Prescriptions: 


Ondansetron Odt [Zofran Odt] 4 mg PO Q8HR PRN #10 tab


 PRN Reason: Nausea


Is patient prescribed a controlled substance at d/c from ED?: No


Referrals: 


Ariel Philip MD [Primary Care Provider] - 1-2 days


Jack Duong MD [STAFF PHYSICIAN] - 1-2 days


Time of Disposition: 14:07

## 2020-11-17 NOTE — XR
EXAMINATION TYPE: XR chest 1V portable

 

DATE OF EXAM: 11/17/2020

 

COMPARISON: 11/25/2019

 

HISTORY: Cough and fever

 

TECHNIQUE: Single frontal view of the chest is obtained.

 

FINDINGS:  There is right upper and lower lobe infiltrate with left peripheral infiltrate upper lobe.
 No pleural effusion or pneumothorax. Limited inspiration. Heart size stable. Atherosclerotic change 
aorta.

 

IMPRESSION:  Bilateral areas of infiltrate correlate for pneumonia.

## 2020-11-20 ENCOUNTER — HOSPITAL ENCOUNTER (INPATIENT)
Dept: HOSPITAL 47 - EC | Age: 65
LOS: 2 days | Discharge: HOME | DRG: 871 | End: 2020-11-22
Attending: HOSPITALIST | Admitting: HOSPITALIST
Payer: COMMERCIAL

## 2020-11-20 DIAGNOSIS — I65.29: ICD-10-CM

## 2020-11-20 DIAGNOSIS — Z82.49: ICD-10-CM

## 2020-11-20 DIAGNOSIS — Z79.82: ICD-10-CM

## 2020-11-20 DIAGNOSIS — Z90.49: ICD-10-CM

## 2020-11-20 DIAGNOSIS — J96.01: ICD-10-CM

## 2020-11-20 DIAGNOSIS — I25.10: ICD-10-CM

## 2020-11-20 DIAGNOSIS — J12.89: ICD-10-CM

## 2020-11-20 DIAGNOSIS — Z87.891: ICD-10-CM

## 2020-11-20 DIAGNOSIS — A41.89: Primary | ICD-10-CM

## 2020-11-20 DIAGNOSIS — F32.9: ICD-10-CM

## 2020-11-20 DIAGNOSIS — E87.1: ICD-10-CM

## 2020-11-20 DIAGNOSIS — N17.9: ICD-10-CM

## 2020-11-20 DIAGNOSIS — U07.1: ICD-10-CM

## 2020-11-20 DIAGNOSIS — Z86.73: ICD-10-CM

## 2020-11-20 DIAGNOSIS — E86.1: ICD-10-CM

## 2020-11-20 DIAGNOSIS — I45.10: ICD-10-CM

## 2020-11-20 DIAGNOSIS — Z98.890: ICD-10-CM

## 2020-11-20 DIAGNOSIS — E78.5: ICD-10-CM

## 2020-11-20 DIAGNOSIS — Z88.8: ICD-10-CM

## 2020-11-20 DIAGNOSIS — Z79.899: ICD-10-CM

## 2020-11-20 DIAGNOSIS — I10: ICD-10-CM

## 2020-11-20 LAB
ALBUMIN SERPL-MCNC: 4.1 G/DL (ref 3.5–5)
ALP SERPL-CCNC: 44 U/L (ref 38–126)
ALT SERPL-CCNC: 86 U/L (ref 4–49)
ANION GAP SERPL CALC-SCNC: 9 MMOL/L
APTT BLD: 25.2 SEC (ref 22–30)
AST SERPL-CCNC: 98 U/L (ref 17–59)
BASOPHILS # BLD AUTO: 0.1 K/UL (ref 0–0.2)
BASOPHILS NFR BLD AUTO: 1 %
BUN SERPL-SCNC: 30 MG/DL (ref 9–20)
CALCIUM SPEC-MCNC: 8.8 MG/DL (ref 8.4–10.2)
CHLORIDE SERPL-SCNC: 94 MMOL/L (ref 98–107)
CO2 SERPL-SCNC: 26 MMOL/L (ref 22–30)
D DIMER PPP FEU-MCNC: 1.96 MG/L FEU (ref ?–0.6)
EOSINOPHIL # BLD AUTO: 0.1 K/UL (ref 0–0.7)
EOSINOPHIL NFR BLD AUTO: 1 %
ERYTHROCYTE [DISTWIDTH] IN BLOOD BY AUTOMATED COUNT: 4.16 M/UL (ref 4.3–5.9)
ERYTHROCYTE [DISTWIDTH] IN BLOOD: 14.2 % (ref 11.5–15.5)
GLUCOSE SERPL-MCNC: 104 MG/DL (ref 74–99)
HCT VFR BLD AUTO: 36.6 % (ref 39–53)
HGB BLD-MCNC: 12.7 GM/DL (ref 13–17.5)
INR PPP: 1 (ref ?–1.2)
LYMPHOCYTES # SPEC AUTO: 0.7 K/UL (ref 1–4.8)
LYMPHOCYTES NFR SPEC AUTO: 11 %
MCH RBC QN AUTO: 30.7 PG (ref 25–35)
MCHC RBC AUTO-ENTMCNC: 34.8 G/DL (ref 31–37)
MCV RBC AUTO: 88 FL (ref 80–100)
MONOCYTES # BLD AUTO: 0.2 K/UL (ref 0–1)
MONOCYTES NFR BLD AUTO: 3 %
NEUTROPHILS # BLD AUTO: 5.2 K/UL (ref 1.3–7.7)
NEUTROPHILS NFR BLD AUTO: 83 %
PH UR: 5.5 [PH] (ref 5–8)
PLATELET # BLD AUTO: 179 K/UL (ref 150–450)
POTASSIUM SERPL-SCNC: 5.1 MMOL/L (ref 3.5–5.1)
PROT SERPL-MCNC: 7.3 G/DL (ref 6.3–8.2)
PROT UR QL: (no result)
PT BLD: 10.4 SEC (ref 9–12)
RBC UR QL: <1 /HPF (ref 0–5)
SODIUM SERPL-SCNC: 129 MMOL/L (ref 137–145)
SP GR UR: 1.02 (ref 1–1.03)
UROBILINOGEN UR QL STRIP: <2 MG/DL (ref ?–2)
WBC # BLD AUTO: 6.3 K/UL (ref 3.8–10.6)
WBC #/AREA URNS HPF: 1 /HPF (ref 0–5)

## 2020-11-20 PROCEDURE — 70450 CT HEAD/BRAIN W/O DYE: CPT

## 2020-11-20 PROCEDURE — 85025 COMPLETE CBC W/AUTO DIFF WBC: CPT

## 2020-11-20 PROCEDURE — 78582 LUNG VENTILAT&PERFUS IMAGING: CPT

## 2020-11-20 PROCEDURE — 87040 BLOOD CULTURE FOR BACTERIA: CPT

## 2020-11-20 PROCEDURE — 80053 COMPREHEN METABOLIC PANEL: CPT

## 2020-11-20 PROCEDURE — 96372 THER/PROPH/DIAG INJ SC/IM: CPT

## 2020-11-20 PROCEDURE — 96361 HYDRATE IV INFUSION ADD-ON: CPT

## 2020-11-20 PROCEDURE — 85610 PROTHROMBIN TIME: CPT

## 2020-11-20 PROCEDURE — 93005 ELECTROCARDIOGRAM TRACING: CPT

## 2020-11-20 PROCEDURE — 85379 FIBRIN DEGRADATION QUANT: CPT

## 2020-11-20 PROCEDURE — 81001 URINALYSIS AUTO W/SCOPE: CPT

## 2020-11-20 PROCEDURE — 72125 CT NECK SPINE W/O DYE: CPT

## 2020-11-20 PROCEDURE — 84484 ASSAY OF TROPONIN QUANT: CPT

## 2020-11-20 PROCEDURE — 96360 HYDRATION IV INFUSION INIT: CPT

## 2020-11-20 PROCEDURE — 99285 EMERGENCY DEPT VISIT HI MDM: CPT

## 2020-11-20 PROCEDURE — 80048 BASIC METABOLIC PNL TOTAL CA: CPT

## 2020-11-20 PROCEDURE — 85730 THROMBOPLASTIN TIME PARTIAL: CPT

## 2020-11-20 PROCEDURE — 87635 SARS-COV-2 COVID-19 AMP PRB: CPT

## 2020-11-20 PROCEDURE — 36415 COLL VENOUS BLD VENIPUNCTURE: CPT

## 2020-11-20 PROCEDURE — 71046 X-RAY EXAM CHEST 2 VIEWS: CPT

## 2020-11-20 RX ADMIN — CEFAZOLIN SCH MLS/HR: 330 INJECTION, POWDER, FOR SOLUTION INTRAMUSCULAR; INTRAVENOUS at 23:15

## 2020-11-20 RX ADMIN — ACETAMINOPHEN PRN MG: 325 TABLET, FILM COATED ORAL at 23:25

## 2020-11-20 NOTE — CT
EXAMINATION TYPE: CT brain angelaine wo con

 

DATE OF EXAM: 11/20/2020

 

COMPARISON: 11/2/2019.

 

HISTORY: fall, syncope

 

CT DLP: 1479.3 mGycm

Automated exposure control for dose reduction was used.

 

TECHNIQUE: CT scan of the head and cervical spine are performed without contrast.

 

FINDINGS:   There is no acute intracranial hemorrhage, mass effect, or midline shift identified.  The
 ventricles and sulci are within normal limits in size.  The globes are intact. There is mild mucosal
 thickening of the bilateral maxillary sinuses. Stable right frontal lobe encephalomalacia.

 

Cervical spine is visualized in its entirety from C1 through upper thoracic levels and demonstrates s
atisfactory alignment without evidence of acute fracture or dislocation.  Prevertebral soft tissue ap
pears within normal limits.  The C1-C2 articulation is unremarkable.  There is severe C6-C7 spondylos
is. Otherwise mild to moderate elsewhere. There is incompletely imaged moderate right and mild left p
ulmonary opacities. Incidental small bilateral thyroid nodules seen.

 

IMPRESSION:

1. There is no acute fracture or dislocation evident in the cervical spine.

2. No acute intracranial hemorrhage, mass effect, or midline shift is seen.

3. Incompletely imaged bilateral pulmonary opacities.

4. Chronic findings as above.

## 2020-11-20 NOTE — XR
EXAMINATION TYPE: XR chest 2V

 

DATE OF EXAM: 11/20/2020

 

COMPARISON: 11/17/2020.

 

HISTORY: Fever and syncope.

 

TECHNIQUE:  Frontal and lateral views of the chest are obtained.

 

FINDINGS:  There is slight increase of bilateral moderate, peripheral based opacities in the mid to l
ower lungs. No pleural effusion, or pneumothorax seen.  The cardiac silhouette size is within normal 
limits.   The osseous structures are intact.

 

IMPRESSION:  Increased bilateral moderate opacities.

## 2020-11-20 NOTE — ED
General Adult HPI





- General


Chief complaint: Syncope


Stated complaint: fever/dry heaves/fell the other day


Time Seen by Provider: 20 19:29


Source: patient, RN notes reviewed, old records reviewed


Mode of arrival: ambulatory


Limitations: no limitations





- History of Present Illness


Initial comments: 


65-year-old male patient to ED for chief complaint of minimal cough, myalgia 

right lateral rib pain.  Patient reportedly had a syncopal episode yesterday 

while having a bowel movement and hit his head.  Does not believe he was 

unconscious for a long period of time.  Shortness of breath.  Presents to ED for

further evaluation.





Systemic: Pt denies fatigue, fever/chills, rash. Pt denies weakness, night 

sweats, weight loss. 


Neuro: Pt denies headache, visual disturbances, pre-syncope.


HEENT: Pt denies ocular discharge or irritation, otalgia, rhinorrhea, 

pharyngitis or notable lymphadenopathy. 


Cardiopulmonary: Pt denies chest pain, SOB, heart palpitations, dyspnea on 

exertion.  


Abdominal/GI: Pt denies abdominal pain, n/v/d. 


: Pt denies dysuria, burning w/ urination, frequency/urgency. Denies new onset

urinary or bowel incontinence.  


MSK: Pt denies myalgia, loss of strength or function in extremities. 


Neuro: Pt denies new onset weakness, paresthesias. 








- Related Data


                                Home Medications











 Medication  Instructions  Recorded  Confirmed


 


Aspirin 325 mg PO DAILY 18


 


Cholecalciferol (Vitamin D3) 2,000 unit PO DAILY 18





[Vitamin D3]   


 


Citalopram Hydrobromide 40 mg PO DAILY 18





[Citalopram HBr]   


 


Clopidogrel [Plavix] 75 mg PO HS 18


 


Omeprazole 40 mg PO DAILY 18


 


lisinopriL [Zestril] 20 mg PO BID 18


 


Omega-3 Fatty Acids/Fish Oil [Fish 1 cap PO DAILY 19





Oil 1,000 mg Softgel]   


 


Atorvastatin Calcium [Lipitor] 10 mg PO HS 20








                                  Previous Rx's











 Medication  Instructions  Recorded


 


Acetaminophen Tab [Tylenol] 650 mg PO Q6HR PRN  tab 19


 


Ondansetron Odt [Zofran Odt] 4 mg PO Q8HR PRN #10 tab 20











                                    Allergies











Allergy/AdvReac Type Severity Reaction Status Date / Time


 


hydrochlorothiazide Allergy  Rash/Hives Verified 20 19:23














Review of Systems


ROS Statement: 


Those systems with pertinent positive or pertinent negative responses have been 

documented in the HPI.





ROS Other: All systems not noted in ROS Statement are negative.





Past Medical History


Past Medical History: Cancer, CVA/TIA, Hyperlipidemia, Hypertension


Additional Past Medical History / Comment(s): "rt carotid artery 100% blocked", 

past "stroke"  some mild short term memory problems", throat polyps removed pt 

stated were cancerous, ANEURYSM  ("CHEST AREA")- DR WATCHING IT"


History of Any Multi-Drug Resistant Organisms: None Reported


Past Surgical History: Back Surgery, Cholecystectomy, Heart Catheterization, 

Hernia Repair


Additional Past Surgical History / Comment(s): carolynn inguinal hernia repair, 

throat polpys removed


Past Anesthesia/Blood Transfusion Reactions: Previous Problems w/ Anesthesia, 

Motion Sickness


Additional Past Anesthesia/Blood Transfusion Reaction / Comment(s): STATES TAKES

LONGER TO WAKE UP"


Past Psychological History: Depression


Smoking Status: Former smoker


Past Alcohol Use History: None Reported


Past Drug Use History: None Reported





- Past Family History


  ** Mother


Family Medical History: CVA/TIA





  ** Father


Family Medical History: CVA/TIA, Myocardial Infarction (MI)


Additional Family Medical History / Comment(s): Father  of a MI while in his

60s or 70s





General Exam





- General Exam Comments


Initial Comments: 








Constitutional: NAD, AOX3, Pt has pleasant affect. 


HEENT: NC/AT, trachea midline, neck supple, no lymphadenopathy. Posterior 

pharynx non erythematous, without exudates. External ears appear normal, without

discharge. Mucous membranes moist. Eyes PERRLA, EOM intact. There is no scleral 

icterus. No pallor noted. 


Cardiopulmonary: RRR, no murmurs, rubs or gallops, no JVD noted. Lungs CTAB in 

anterior and posterior fields. No peripheral edema. 


Abdominal exam: Abdomen soft and non-distended. Abdomen non-tender to palpation 

in all 4 quadrants. Bowel sounds active in LLQ. No hepatosplenomegaly. No 

ecchymosis


Neuro: CN II-XII grossly intact. No nuchal rigidity. No raccon eyes, no eduardo 

sign, no hemotympanum. No cervical spinal tenderness. 


MSK: No posterior calf tenderness bilaterally, homans sign negative bilaterally.

Posterior tibialis and radial pulse +2 bilaterally. Sensation intact in upper 

and lower extremities. Full active ROM in upper and lower extremities, 5/5 

stregnth. 











Limitations: no limitations





Course





                                   Vital Signs











  20





  19:19 20:37


 


Temperature 99.5 F 


 


Pulse Rate 99 90


 


Respiratory 20 18





Rate  


 


Blood Pressure 115/78 123/75


 


O2 Sat by Pulse 92 L 95





Oximetry  














Medical Decision Making





- Medical Decision Making





65-year-old male patient to ED for chief complaint of minimal cough, myalgia 

right lateral rib pain.  Patient reportedly had a syncopal episode yesterday 

while having a bowel movement and hit his head.  Does not believe he was 

unconscious for a long period of time.  Shortness of breath.  Presents to ED for

further evaluation.  Patient vital signs are stable, afebrile.  Physical exam 

negative for acute pathology.  Laboratory investigations revealed creatinine 

slightly worse than before.  D-dimer is elevated.  Troponin is negative.  Pain 

C-spine is negative for acute process intracranially within the cervical spine. 

Chest clear displayed increase bilateral moderate opacities.  EKG is 

nonischemic.  Patient administered a dose of Lovenox and sent to a VQ scan.  

Coronavirus test was found be positive. Patient will be admitted for further 

evaluation. Case discussed in depth with Dr. Quick. 














- Lab Data


Result diagrams: 


                                 20 19:50





                                 20 19:50





                                   Lab Results











  20 Range/Units





  19:50 19:50 19:50 


 


WBC  6.3    (3.8-10.6)  k/uL


 


RBC  4.16 L    (4.30-5.90)  m/uL


 


Hgb  12.7 L    (13.0-17.5)  gm/dL


 


Hct  36.6 L    (39.0-53.0)  %


 


MCV  88.0    (80.0-100.0)  fL


 


MCH  30.7    (25.0-35.0)  pg


 


MCHC  34.8    (31.0-37.0)  g/dL


 


RDW  14.2    (11.5-15.5)  %


 


Plt Count  179    (150-450)  k/uL


 


MPV  8.2    


 


Neutrophils %  83    %


 


Lymphocytes %  11    %


 


Monocytes %  3    %


 


Eosinophils %  1    %


 


Basophils %  1    %


 


Neutrophils #  5.2    (1.3-7.7)  k/uL


 


Lymphocytes #  0.7 L    (1.0-4.8)  k/uL


 


Monocytes #  0.2    (0-1.0)  k/uL


 


Eosinophils #  0.1    (0-0.7)  k/uL


 


Basophils #  0.1    (0-0.2)  k/uL


 


PT   10.4   (9.0-12.0)  sec


 


INR   1.0   (<1.2)  


 


APTT   25.2   (22.0-30.0)  sec


 


D-Dimer   1.96 H   (<0.60)  mg/L FEU


 


Sodium     (137-145)  mmol/L


 


Potassium     (3.5-5.1)  mmol/L


 


Chloride     ()  mmol/L


 


Carbon Dioxide     (22-30)  mmol/L


 


Anion Gap     mmol/L


 


BUN     (9-20)  mg/dL


 


Creatinine     (0.66-1.25)  mg/dL


 


Est GFR (CKD-EPI)AfAm     (>60 ml/min/1.73 sqM)  


 


Est GFR (CKD-EPI)NonAf     (>60 ml/min/1.73 sqM)  


 


Glucose     (74-99)  mg/dL


 


Calcium     (8.4-10.2)  mg/dL


 


Total Bilirubin     (0.2-1.3)  mg/dL


 


AST     (17-59)  U/L


 


ALT     (4-49)  U/L


 


Alkaline Phosphatase     ()  U/L


 


Troponin I     (0.000-0.034)  ng/mL


 


Total Protein     (6.3-8.2)  g/dL


 


Albumin     (3.5-5.0)  g/dL


 


Urine Color    Yellow  


 


Urine Appearance    Clear  (Clear)  


 


Urine pH    5.5  (5.0-8.0)  


 


Ur Specific Gravity    1.025  (1.001-1.035)  


 


Urine Protein    1+ H  (Negative)  


 


Urine Glucose (UA)    Negative  (Negative)  


 


Urine Ketones    Negative  (Negative)  


 


Urine Blood    Negative  (Negative)  


 


Urine Nitrite    Negative  (Negative)  


 


Urine Bilirubin    Negative  (Negative)  


 


Urine Urobilinogen    <2.0  (<2.0)  mg/dL


 


Ur Leukocyte Esterase    Negative  (Negative)  


 


Urine RBC    <1  (0-5)  /hpf


 


Urine WBC    1  (0-5)  /hpf


 


Urine Mucus    Rare H  (None)  /hpf


 


Coronavirus (PCR)     (Not Detectd)  














  20 Range/Units





  19:50 19:50 22:04 


 


WBC     (3.8-10.6)  k/uL


 


RBC     (4.30-5.90)  m/uL


 


Hgb     (13.0-17.5)  gm/dL


 


Hct     (39.0-53.0)  %


 


MCV     (80.0-100.0)  fL


 


MCH     (25.0-35.0)  pg


 


MCHC     (31.0-37.0)  g/dL


 


RDW     (11.5-15.5)  %


 


Plt Count     (150-450)  k/uL


 


MPV     


 


Neutrophils %     %


 


Lymphocytes %     %


 


Monocytes %     %


 


Eosinophils %     %


 


Basophils %     %


 


Neutrophils #     (1.3-7.7)  k/uL


 


Lymphocytes #     (1.0-4.8)  k/uL


 


Monocytes #     (0-1.0)  k/uL


 


Eosinophils #     (0-0.7)  k/uL


 


Basophils #     (0-0.2)  k/uL


 


PT     (9.0-12.0)  sec


 


INR     (<1.2)  


 


APTT     (22.0-30.0)  sec


 


D-Dimer     (<0.60)  mg/L FEU


 


Sodium  129 L    (137-145)  mmol/L


 


Potassium  5.1    (3.5-5.1)  mmol/L


 


Chloride  94 L    ()  mmol/L


 


Carbon Dioxide  26    (22-30)  mmol/L


 


Anion Gap  9    mmol/L


 


BUN  30 H    (9-20)  mg/dL


 


Creatinine  1.56 H    (0.66-1.25)  mg/dL


 


Est GFR (CKD-EPI)AfAm  54    (>60 ml/min/1.73 sqM)  


 


Est GFR (CKD-EPI)NonAf  46    (>60 ml/min/1.73 sqM)  


 


Glucose  104 H    (74-99)  mg/dL


 


Calcium  8.8    (8.4-10.2)  mg/dL


 


Total Bilirubin  1.1    (0.2-1.3)  mg/dL


 


AST  98 H    (17-59)  U/L


 


ALT  86 H    (4-49)  U/L


 


Alkaline Phosphatase  44    ()  U/L


 


Troponin I   <0.012   (0.000-0.034)  ng/mL


 


Total Protein  7.3    (6.3-8.2)  g/dL


 


Albumin  4.1    (3.5-5.0)  g/dL


 


Urine Color     


 


Urine Appearance     (Clear)  


 


Urine pH     (5.0-8.0)  


 


Ur Specific Gravity     (1.001-1.035)  


 


Urine Protein     (Negative)  


 


Urine Glucose (UA)     (Negative)  


 


Urine Ketones     (Negative)  


 


Urine Blood     (Negative)  


 


Urine Nitrite     (Negative)  


 


Urine Bilirubin     (Negative)  


 


Urine Urobilinogen     (<2.0)  mg/dL


 


Ur Leukocyte Esterase     (Negative)  


 


Urine RBC     (0-5)  /hpf


 


Urine WBC     (0-5)  /hpf


 


Urine Mucus     (None)  /hpf


 


Coronavirus (PCR)    Detected A  (Not Detectd)  














- EKG Data


-: EKG Interpreted by Me (and Dr. Quick )


EKG Comments: 


Ventricular rate 96, NY interval 146, QT/QTc 366/462, Normal sinus rhythm, 

normal EKG. no concern for acute ischemia. 








Disposition


Clinical Impression: 


 Syncope, COVID-19, Chest pain





Disposition: ADMITTED AS IP TO THIS HOSP


Condition: Serious


Is patient prescribed a controlled substance at d/c from ED?: No


Referrals: 


Ariel Philip MD [Primary Care Provider] - 1-2 days

## 2020-11-20 NOTE — NM
EXAMINATION TYPE: NM pul vent and perfuse

 

DATE OF EXAM: 11/20/2020

 

COMPARISON: NONE

 

HISTORY: Chest pain short of breath

 

TECHNIQUE:  Utilizing inhalation of 62.5 mCi Tc 99m DTPA aerosol and intravenous injection of 5.1 mCi
 of Tc 99m MAA, ventilation and perfusion images are acquired post injection in multiple projections.


 

FINDINGS: 

There are small matching very small subsegmental ventilation and perfusion defect at the lung bases p
osteriorly. There is no ventilation/perfusion mismatch. There is no segmental type defect. The remain
mahnaz of exam is unremarkable..

 

IMPRESSION: 

Very small matching defects. This is consistent with mild airway disease. There is low probability of
 pulmonary embolism.

## 2020-11-21 LAB
ANION GAP SERPL CALC-SCNC: 8 MMOL/L
BUN SERPL-SCNC: 30 MG/DL (ref 9–20)
CALCIUM SPEC-MCNC: 8.6 MG/DL (ref 8.4–10.2)
CHLORIDE SERPL-SCNC: 101 MMOL/L (ref 98–107)
CO2 SERPL-SCNC: 23 MMOL/L (ref 22–30)
GLUCOSE SERPL-MCNC: 109 MG/DL (ref 74–99)
POTASSIUM SERPL-SCNC: 4.4 MMOL/L (ref 3.5–5.1)
SODIUM SERPL-SCNC: 132 MMOL/L (ref 137–145)

## 2020-11-21 RX ADMIN — METOPROLOL TARTRATE SCH MG: 50 TABLET, FILM COATED ORAL at 14:53

## 2020-11-21 RX ADMIN — CEFAZOLIN SCH MLS/HR: 330 INJECTION, POWDER, FOR SOLUTION INTRAMUSCULAR; INTRAVENOUS at 21:27

## 2020-11-21 RX ADMIN — CEFAZOLIN SCH MLS/HR: 330 INJECTION, POWDER, FOR SOLUTION INTRAMUSCULAR; INTRAVENOUS at 11:33

## 2020-11-21 RX ADMIN — ACETAMINOPHEN PRN MG: 325 TABLET, FILM COATED ORAL at 14:53

## 2020-11-21 RX ADMIN — CITALOPRAM HYDROBROMIDE SCH MG: 20 TABLET ORAL at 14:53

## 2020-11-21 NOTE — P.HPIM
History of Present Illness


Patient was having cough myalgias found to have Covid 19.  Patient's symptoms 

has been going on for 2 weeks patient was coughing and appears to have had a 

cough syncope.  She is found to have acute renal failure and hyponatremia.  Gabriel rico denied any significant diarrhea was having nausea.








Review of Systems








REVIEW OF SYSTEMS: 


CONSTITUTIONAL: As mentioned in HPI


HEENT: No recent visual problems or hearing problems. Denied any sore throat. 


CARDIOVASCULAR: No chest pain, orthopnea, PND, no palpitations. 


PULMONARY: No shortness of breath, no cough, no hemoptysis. 


GASTROINTESTINAL: No diarrhea, no nausea, no vomiting, no abdominal pain. 


NEUROLOGICAL: No headaches, no weakness, no numbness. 


HEMATOLOGICAL: Denies any bleeding or petechiae. 


GENITOURINARY: Denies any burning micturition, frequency, or urgency. 


MUSCULOSKELETAL/RHEUMATOLOGICAL: Denies any joint pain, swelling, or any muscle 

pain. 


ENDOCRINE: Denies any polyuria or polydipsia. 





The rest of the 14-point review of systems is negative.











Past Medical History


Past Medical History: Cancer, CVA/TIA, Hyperlipidemia, Hypertension


Additional Past Medical History / Comment(s): "rt carotid artery 100% blocked", 

past "stroke"  some mild short term memory problems", throat polyps removed pt 

stated were cancerous, ANEURYSM  ("CHEST AREA")- DR WATCHING IT"


History of Any Multi-Drug Resistant Organisms: None Reported


Past Surgical History: Back Surgery, Cholecystectomy, Heart Catheterization, 

Hernia Repair


Additional Past Surgical History / Comment(s): carolynn inguinal hernia repair, 

throat polpys removed


Past Anesthesia/Blood Transfusion Reactions: Previous Problems w/ Anesthesia, 

Motion Sickness


Additional Past Anesthesia/Blood Transfusion Reaction / Comment(s): STATES TAKES

LONGER TO WAKE UP"


Past Psychological History: Depression


Additional Psychological History / Comment(s): Pt resides with his spouse. He is

independent. pt works


Smoking Status: Former smoker


Past Alcohol Use History: None Reported


Additional Past Alcohol Use History / Comment(s): Pt started smoking at age 16 

() and quit , smoked 2-3 ppd. Pt stated sometimes i would light one but 

put it down to do something and never smoked it.


Past Drug Use History: None Reported





- Past Family History


  ** Mother


Family Medical History: CVA/TIA





  ** Father


Family Medical History: CVA/TIA, Myocardial Infarction (MI)


Additional Family Medical History / Comment(s): Father  of a MI while in his

60s or 70s





Medications and Allergies


                                Home Medications











 Medication  Instructions  Recorded  Confirmed  Type


 


Aspirin 325 mg PO DAILY 18 History


 


Cholecalciferol (Vitamin D3) 2,000 unit PO DAILY 18 History





[Vitamin D3]    


 


Citalopram Hydrobromide 40 mg PO DAILY 18 History





[Citalopram HBr]    


 


Clopidogrel [Plavix] 75 mg PO HS 18 History


 


Omeprazole 40 mg PO DAILY 18 History


 


lisinopriL [Zestril] 20 mg PO BID 18 History


 


Omega-3 Fatty Acids/Fish Oil [Fish 1 cap PO DAILY 19 History





Oil 1,000 mg Softgel]    


 


Atorvastatin Calcium [Lipitor] 10 mg PO HS 20 History


 


Metoprolol Tartrate [Lopressor] 100 mg PO DAILY 20 History


 


Vascepa 2.4gm 2.4 gm PO DAILY 20 History








                                    Allergies











Allergy/AdvReac Type Severity Reaction Status Date / Time


 


hydrochlorothiazide Allergy  Rash/Hives Verified 20 09:37














Physical Exam


Vitals: 


                                   Vital Signs











  Temp Pulse Pulse Resp BP BP Pulse Ox


 


 20 08:00  97.1 F L   81  18   129/70  96


 


 20 04:00  97.7 F   73  18   116/72  95


 


 20 00:20    84  18   


 


 20 00:06  98.8 F   84  18   106/69  92 L


 


 20 23:14  100.7 F H  91   18  122/72   94 L


 


 20 20:37   90   18  123/75   95


 


 20 19:19  99.5 F  99   20  115/78   92 L








                                Intake and Output











 20





 22:59 06:59 14:59


 


Output Total   700


 


Balance   -700


 


Output:   


 


  Urine   700


 


Other:   


 


  # Voids  1 


 


  Weight 81.647 kg 78.7 kg 




















PHYSICAL EXAMINATION: 





GENERAL: The patient is alert and oriented x3, not in any acute distress. Well d

eveloped, well nourished. 


HEENT: Pupils are round and equally reacting to light. EOMI. No scleral icterus.

 No conjunctival pallor. Normocephalic, atraumatic. No pharyngeal erythema. No 

thyromegaly. 


CARDIOVASCULAR: S1 and S2 present. No murmurs, rubs, or gallops. 


PULMONARY: Chest is clear to auscultation, no wheezing or crackles. 


ABDOMEN: Soft, nontender, nondistended, normoactive bowel sounds. No palpable 

organomegaly. 


MUSCULOSKELETAL: No joint swelling or deformity.


EXTREMITIES: No cyanosis, clubbing, or pedal edema. 


NEUROLOGICAL: Gross neurological examination did not reveal any focal deficits. 


SKIN: No rashes. 














Results


CBC & Chem 7: 


                                 20 19:50





                                 20 04:58


Labs: 


                  Abnormal Lab Results - Last 24 Hours (Table)











  20 Range/Units





  19:50 19:50 19:50 


 


RBC  4.16 L    (4.30-5.90)  m/uL


 


Hgb  12.7 L    (13.0-17.5)  gm/dL


 


Hct  36.6 L    (39.0-53.0)  %


 


Lymphocytes #  0.7 L    (1.0-4.8)  k/uL


 


D-Dimer   1.96 H   (<0.60)  mg/L FEU


 


Sodium     (137-145)  mmol/L


 


Chloride     ()  mmol/L


 


BUN     (9-20)  mg/dL


 


Creatinine     (0.66-1.25)  mg/dL


 


Glucose     (74-99)  mg/dL


 


AST     (17-59)  U/L


 


ALT     (4-49)  U/L


 


Urine Protein    1+ H  (Negative)  


 


Urine Mucus    Rare H  (None)  /hpf


 


Coronavirus (PCR)     (Not Detectd)  














  20 Range/Units





  19:50 22:04 04:58 


 


RBC     (4.30-5.90)  m/uL


 


Hgb     (13.0-17.5)  gm/dL


 


Hct     (39.0-53.0)  %


 


Lymphocytes #     (1.0-4.8)  k/uL


 


D-Dimer     (<0.60)  mg/L FEU


 


Sodium  129 L   132 L  (137-145)  mmol/L


 


Chloride  94 L    ()  mmol/L


 


BUN  30 H   30 H  (9-20)  mg/dL


 


Creatinine  1.56 H   1.38 H  (0.66-1.25)  mg/dL


 


Glucose  104 H   109 H  (74-99)  mg/dL


 


AST  98 H    (17-59)  U/L


 


ALT  86 H    (4-49)  U/L


 


Urine Protein     (Negative)  


 


Urine Mucus     (None)  /hpf


 


Coronavirus (PCR)   Detected A   (Not Detectd)  














Thrombosis Risk Factor Assmnt





- Choose All That Apply


Any of the Below Risk Factors Present?: Yes


Each Factor Represents 1 point: Age 41-60 years, Obesity (BMI >25)


Thrombosis Risk Factor Assessment Total Risk Factor Score: 2


Thrombosis Risk Factor Assessment Level: Low Risk





Assessment and Plan


Plan: 


-: Syncope: Secondary to cough and cough induced vasovagal event.  No further 

intervention at this time.


-Acute renal failure secondary to infection with cough with 19 patient will be 

continued on IV fluids, hold off on ACE inhibitor


-Sepsis and pneumonia secondary to Covid 19.  Patient is a bit hypoxic at this 

time patient was started on Decadron Lovenox infectious disease will be 

consulted


-Rule out pulmonary embolism


-Acute hypoxic respiratory failure secondary to Covid 19 infection


-Hypovolemic hyponatremia: IV fluids as mentioned above


-Hyperlipidemia


-Hypertension


-Coronary artery disease with cardiac catheterization and patient is presently 

on aspirin and Plavix along with beta blocker and a statin which will be 

resumed.


-DVT prophylaxis with Lovenox

## 2020-11-21 NOTE — P.CRDCN
History of Present Illness


Consult date: 20


Reason for Consult (text): 


Chest pain, syncope, COVID-19





Consult reason: sycope, chest pain


Chief complaint: Chest pain, syncope, COVID-19


History of present illness: 


HISTORY OF PRESENT ILLNESS AND PLAN:








This is a  65-year-old  male with history of hernia repair, 

hypertension, hyperlipidemia, CVA, nonobstructive CAD by cardiac cath , mild

AR and carotid artery stenosis.  Patient observed this morning at bedside in 

good spirits and alert/oriented. Patient presents to emergency room with 

complaints of syncopal episode while having a bowel movement (pt hit his head), 

fever, night sweats and cough.  Patient tested positive for COVID-19. Currently 

lying in bed with no acute distress and states he feels much improved with IV 

hydration. Has no current complaints of cough or generalized discomfort.  

Patient has no current complaints of chest pain, chest pressure, shortness of 

breath or palpitations. Patient has no lower extremity edema. Troponins negative

3.  Patient continue sinus rhythm with right bundle branch block, heart rate 

78. Current fever 100.7.  Vital signs stable.  95% on 3 L.  Patient follows with

Dr. Howard in office.  Cardiac cath in 2017 shows non-obstructive disease.  Most 

recent echo 2019 shows preserved EF at 55-60%, mild AR, mild AS. Has hx of

100% stenosis and right carotid artery. DX of chest 20 shows bilateral 

obesity.  CT of head 20 shows no acute process.  VQ scan 20 negative

for PE. Currently on subcutaneous Lovenox.





SIGNIFICANT PAST MEDICAL HISTORY: Hernia repair, hypertension, hyperlipidemia, 

CVA, nonobstructive CAD by cardiac cath , mild AR and carotid artery 

stenosis.





PAST SURGICAL HISTORY:


See list.





EKG = sinus rhythm, heart rate 78


Troponins negative x 3





SIGNIFICANT LABORATORY VALUES: Hemoglobin 12.7, hematocrit 36.6.  D-dimer 1.96. 

Sodium 129.  BUN 30, CR 1.56.  Calcium elevated 104. AST/ALT, both elevated.





Chest x-ray - 20  increased bilateral moderate OP cities/


CT of head 20 = no acute process





Most recent echo 19 = EF 55-60%, mild AR, mild AS.





Most recent cardiac cath  = 2017 non-obstructive CAD, no gradient across AV.











REVIEW OF SYSTEMS: 





CONSTITUTIONAL: Denies fever. Denies chills.





EYES: Denies blurred vision. Denies blurred vision or vision changes. Denies eye

pain.


EARS, NOSE, MOUTH & THROAT: Denies headache. Denies sore throat. Denies ear pain

Denies hemoptysis.





CARDIOVASCULAR: Denies chest pain. Complains of prior shortness of breath. 

Denies orthopnea. Denies PND. Denies palpitations.


RESPIRATORY: Complains of cough and prior shortness of breath. 





GASTROINTESTINAL: Denies abdominal pain or distention. Denies diarrhea. Denies 

constipation. Denies nausea. Denies vomiting.


MUSCULOSKELETAL: Complaints of fatigue and myalgias.


INTEGUMENTARY: Denies pruitis. Denies rash.





ENDOCRINE: Complains of fatigue. Denies weight change. Denies polydipsia. Denies

polyurina Denies heat/cold intolerance.


GENITOURINARY: Denies burning, hematuria or urgency with micturation.


HEMATOLOGIC: Denies history of anemia. Denies bleeding.





NEUROLOGIC: Denies numbness. Denies tingling. Denies weakness.


PSYCHIATRIC: Denies anxiety. Denies depression.





PHYSICAL EXAM: 





GENERAL: Well developed, in no acute distress.





HEENT: Head is atraumatic, normocephalic. Pupils are equal, round. Extra ocular 

movements intact. Mucous membranes moist. Neck supple. No JVD. No carotid bruit.

No thyromegaly.





LUNGS: Bilateral wheeze, rhonchi to auscultation. No chest wall tenderness on 

palpation or with deep breathing.


HEART: Regular rate and rhythm, no rubs or gallops. S1 and S2 heard. I/VI 

systolic murmur at LSB.





ABDOMEN: Abdominal exam, WNL. Bowel sounds x4 quads. Soft, non-tender, without 

masses, organomegaly, or abdominal aorta enlargement.


 


EXTREMITIES/VASCULAR: Extremities have easily palpable radial, femoral, dorsalis

pedis and posterior tibial pulses. No cyanosis, calf tenderness. No BLE edema.





NEUROLOGIC: Patient is awake, alert and oriented x3. No focal neurologic 

abnormalities.





FINAL IMPRESSION: 


1. Vasovagal syncope


2. COVID 19 infection


3. non-obstructive CAD


4. hypertension


5. hyperlipidemia


6. Carotid artery stenosis


7. Mild AR








PLAN: No acute cardiology process. Patient to continue same medical/medication 

regime, resume home meds. Cardiology to follow on an as-needed basis. FOV in 

office with Dr. Duong on discharge.








***Nurse Practitioner note has been reviewed by the Physician. Signing provider 

agrees with the documented findings, assessment and plan of care.








Past Medical History


Past Medical History: Cancer, CVA/TIA, Hyperlipidemia, Hypertension


Additional Past Medical History / Comment(s): "rt carotid artery 100% blocked", 

past "stroke"  some mild short term memory problems", throat polyps removed pt 

stated were cancerous, ANEURYSM  ("CHEST AREA")- DR WATCHING IT"


History of Any Multi-Drug Resistant Organisms: None Reported


Past Surgical History: Back Surgery, Cholecystectomy, Heart Catheterization, 

Hernia Repair


Additional Past Surgical History / Comment(s): carolynn inguinal hernia repair, 

throat polpys removed


Past Anesthesia/Blood Transfusion Reactions: Previous Problems w/ Anesthesia, 

Motion Sickness


Additional Past Anesthesia/Blood Transfusion Reaction / Comment(s): STATES TAKES

LONGER TO WAKE UP"


Past Psychological History: Depression


Additional Psychological History / Comment(s): Pt resides with his spouse. He is

independent. pt works


Smoking Status: Former smoker


Past Alcohol Use History: None Reported


Additional Past Alcohol Use History / Comment(s): Pt started smoking at age 16 

() and quit , smoked 2-3 ppd. Pt stated sometimes i would light one but 

put it down to do something and never smoked it.


Past Drug Use History: None Reported





- Past Family History


  ** Mother


Family Medical History: CVA/TIA





  ** Father


Family Medical History: CVA/TIA, Myocardial Infarction (MI)


Additional Family Medical History / Comment(s): Father  of a MI while in his

60s or 70s





Medications and Allergies


                                Home Medications











 Medication  Instructions  Recorded  Confirmed  Type


 


Aspirin 325 mg PO DAILY 18 History


 


Cholecalciferol (Vitamin D3) 2,000 unit PO DAILY 18 History





[Vitamin D3]    


 


Citalopram Hydrobromide 40 mg PO DAILY 18 History





[Citalopram HBr]    


 


Clopidogrel [Plavix] 75 mg PO HS 18 History


 


Omeprazole 40 mg PO DAILY 18 History


 


lisinopriL [Zestril] 20 mg PO BID 18 History


 


Omega-3 Fatty Acids/Fish Oil [Fish 1 cap PO DAILY 19 History





Oil 1,000 mg Softgel]    


 


Atorvastatin Calcium [Lipitor] 10 mg PO HS 20 History


 


Metoprolol Tartrate [Lopressor] 100 mg PO DAILY 20 History


 


Vascepa 2.4gm 2.4 gm PO DAILY 20 History








                                    Allergies











Allergy/AdvReac Type Severity Reaction Status Date / Time


 


hydrochlorothiazide Allergy  Rash/Hives Verified 20 09:37














Physical Exam


Vitals: 


                                   Vital Signs











  Temp Pulse Pulse Resp BP BP Pulse Ox


 


 20 08:00  97.1 F L   81  18   129/70  96


 


 20 04:00  97.7 F   73  18   116/72  95


 


 20 00:20    84  18   


 


 20 00:06  98.8 F   84  18   106/69  92 L


 


 20 23:14  100.7 F H  91   18  122/72   94 L


 


 20 20:37   90   18  123/75   95


 


 20 19:19  99.5 F  99   20  115/78   92 L








                                Intake and Output











 20





 22:59 06:59 14:59


 


Output Total   700


 


Balance   -700


 


Output:   


 


  Urine   700


 


Other:   


 


  # Voids  1 


 


  Weight 81.647 kg 78.7 kg 














Results





                                 20 19:50





                                 20 04:58


                                 Cardiac Enzymes











  20 Range/Units





  19:50 19:50 23:51 


 


AST  98 H    (17-59)  U/L


 


Troponin I   <0.012  <0.012  (0.000-0.034)  ng/mL














  20 Range/Units





  04:58 


 


AST   (17-59)  U/L


 


Troponin I  <0.012  (0.000-0.034)  ng/mL








                                   Coagulation











  20 Range/Units





  19:50 


 


PT  10.4  (9.0-12.0)  sec


 


APTT  25.2  (22.0-30.0)  sec








                                       CBC











  20 Range/Units





  19:50 


 


WBC  6.3  (3.8-10.6)  k/uL


 


RBC  4.16 L  (4.30-5.90)  m/uL


 


Hgb  12.7 L  (13.0-17.5)  gm/dL


 


Hct  36.6 L  (39.0-53.0)  %


 


Plt Count  179  (150-450)  k/uL








                          Comprehensive Metabolic Panel











  20 Range/Units





  19:50 04:58 


 


Sodium  129 L  132 L  (137-145)  mmol/L


 


Potassium  5.1  4.4  (3.5-5.1)  mmol/L


 


Chloride  94 L  101  ()  mmol/L


 


Carbon Dioxide  26  23  (22-30)  mmol/L


 


BUN  30 H  30 H  (9-20)  mg/dL


 


Creatinine  1.56 H  1.38 H  (0.66-1.25)  mg/dL


 


Glucose  104 H  109 H  (74-99)  mg/dL


 


Calcium  8.8  8.6  (8.4-10.2)  mg/dL


 


AST  98 H   (17-59)  U/L


 


ALT  86 H   (4-49)  U/L


 


Alkaline Phosphatase  44   ()  U/L


 


Total Protein  7.3   (6.3-8.2)  g/dL


 


Albumin  4.1   (3.5-5.0)  g/dL








                               Current Medications











Generic Name Dose Route Start Last Admin





  Trade Name Freq  PRN Reason Stop Dose Admin


 


Acetaminophen  650 mg  20 22:56  20 23:25





  Acetaminophen Tab 325 Mg Tab  PO   650 mg





  Q6HR PRN   Administration





  Mild Pain or Fever > 100.5  


 


Aspirin  81 mg  20 09:00 





  Aspirin 81 Mg  PO  





  DAILY [facility]  


 


Atorvastatin Calcium  10 mg  20 21:00 





  Atorvastatin 10 Mg Tab  PO  





  HS [facility]  


 


Citalopram Hydrobromide  40 mg  20 09:00 





  Citalopram Hydrobromide 20 Mg Tab  PO  





  DAILY [facility]  


 


Clopidogrel Bisulfate  75 mg  20 21:00 





  Clopidogrel 75 Mg Tab  PO  





  HS [facility]  


 


Dexamethasone  6 mg  20 09:00 





  Dexamethasone 2 Mg Tab  PO  20 09:01 





  DAILY [facility]  


 


Enoxaparin Sodium  40 mg  20 09:00 





  Enoxaparin 40 Mg/0.4 Ml Syringe  SQ  





  Q24HR [facility]  


 


Sodium Chloride  1,000 mls @ 75 mls/hr  20 21:30  20 11:33





  Saline 0.9%  IV   75 mls/hr





  [id number] OLYA   Administration


 


Metoprolol Tartrate  100 mg  20 09:00 





  Metoprolol Tartrate 50 Mg Tab  PO  





  DAILY [facility]  


 


Naloxone HCl  0.2 mg  20 22:56 





  Naloxone 0.4 Mg/Ml 1 Ml Vial  IV  





  Q2M PRN  





  Opioid Reversal  


 


Pantoprazole Sodium  40 mg  20 07:30 





  Pantoprazole 40 Mg Tablet  PO  





  [id number] OLYA  








                                Intake and Output











 20





 22:59 06:59 14:59


 


Output Total   700


 


Balance   -700


 


Output:   


 


  Urine   700


 


Other:   


 


  # Voids  1 


 


  Weight 81.647 kg 78.7 kg 








                                        





                                 20 19:50 





                                 20 04:58 











- EKG Interpretation


EKG: sinus rhythm





EKG Interpretations (text)


Sinus Rhythm.

## 2020-11-22 VITALS
HEART RATE: 72 BPM | RESPIRATION RATE: 18 BRPM | TEMPERATURE: 97.3 F | SYSTOLIC BLOOD PRESSURE: 148 MMHG | DIASTOLIC BLOOD PRESSURE: 77 MMHG

## 2020-11-22 LAB
ALBUMIN SERPL-MCNC: 3.7 G/DL (ref 3.5–5)
ALP SERPL-CCNC: 43 U/L (ref 38–126)
ALT SERPL-CCNC: 77 U/L (ref 4–49)
ANION GAP SERPL CALC-SCNC: 10 MMOL/L
AST SERPL-CCNC: 69 U/L (ref 17–59)
BASOPHILS # BLD AUTO: 0 K/UL (ref 0–0.2)
BASOPHILS NFR BLD AUTO: 1 %
BUN SERPL-SCNC: 17 MG/DL (ref 9–20)
CALCIUM SPEC-MCNC: 8.3 MG/DL (ref 8.4–10.2)
CHLORIDE SERPL-SCNC: 105 MMOL/L (ref 98–107)
CO2 SERPL-SCNC: 21 MMOL/L (ref 22–30)
EOSINOPHIL # BLD AUTO: 0.1 K/UL (ref 0–0.7)
EOSINOPHIL NFR BLD AUTO: 3 %
ERYTHROCYTE [DISTWIDTH] IN BLOOD BY AUTOMATED COUNT: 4.01 M/UL (ref 4.3–5.9)
ERYTHROCYTE [DISTWIDTH] IN BLOOD: 14.6 % (ref 11.5–15.5)
GLUCOSE SERPL-MCNC: 95 MG/DL (ref 74–99)
HCT VFR BLD AUTO: 36.9 % (ref 39–53)
HGB BLD-MCNC: 12 GM/DL (ref 13–17.5)
LYMPHOCYTES # SPEC AUTO: 0.9 K/UL (ref 1–4.8)
LYMPHOCYTES NFR SPEC AUTO: 21 %
MCH RBC QN AUTO: 29.9 PG (ref 25–35)
MCHC RBC AUTO-ENTMCNC: 32.5 G/DL (ref 31–37)
MCV RBC AUTO: 92.1 FL (ref 80–100)
MONOCYTES # BLD AUTO: 0.1 K/UL (ref 0–1)
MONOCYTES NFR BLD AUTO: 3 %
NEUTROPHILS # BLD AUTO: 3.1 K/UL (ref 1.3–7.7)
NEUTROPHILS NFR BLD AUTO: 71 %
PLATELET # BLD AUTO: 230 K/UL (ref 150–450)
POTASSIUM SERPL-SCNC: 4.7 MMOL/L (ref 3.5–5.1)
PROT SERPL-MCNC: 6.8 G/DL (ref 6.3–8.2)
SODIUM SERPL-SCNC: 136 MMOL/L (ref 137–145)
WBC # BLD AUTO: 4.3 K/UL (ref 3.8–10.6)

## 2020-11-22 RX ADMIN — METOPROLOL TARTRATE SCH MG: 50 TABLET, FILM COATED ORAL at 07:45

## 2020-11-22 RX ADMIN — CITALOPRAM HYDROBROMIDE SCH MG: 20 TABLET ORAL at 07:45

## 2020-11-22 NOTE — P.DS
Providers


Date of admission: 


11/20/20 22:35





Attending physician: 


Ed Richard





Consults: 





                                        





11/20/20 22:56


Consult Physician Stat 


   Consulting Provider: Jack Duong


   Consult Reason/Comments: chest pain, syncope, covid,


   Do you want consulting provider notified?: Yes, Notify in am











Primary care physician: 


Ariel Philip





[facility] Course: 





Patient was having cough myalgias found to have Covid 19.  Patient's symptoms 

has been going on for 2 weeks patient was coughing and appears to have had a 

cough syncope.  She is found to have acute renal failure and hyponatremia.  

Patient denied any significant diarrhea was having nausea.








11/22/2020


Patient is not hypoxic patient is clinically doing well presently doesn't have 

much symptoms.  Patient will be discharged today.  Serum creatinine improved to 

1











PHYSICAL EXAMINATION: 





GENERAL: The patient is alert and oriented x3, not in any acute distress. Well 

developed, well nourished. 


HEENT: Pupils are round and equally reacting to light. EOMI. No scleral icterus.

No conjunctival pallor. Normocephalic, atraumatic. No pharyngeal erythema. No 

thyromegaly. 


CARDIOVASCULAR: S1 and S2 present. No murmurs, rubs, or gallops. 


PULMONARY: Chest is clear to auscultation, no wheezing or crackles. 


ABDOMEN: Soft, nontender, nondistended, normoactive bowel sounds. No palpable 

organomegaly. 


MUSCULOSKELETAL: No joint swelling or deformity.


EXTREMITIES: No cyanosis, clubbing, or pedal edema. 


NEUROLOGICAL: Gross neurological examination did not reveal any focal deficits. 


SKIN: No rashes. 








Assessment and Plan


Plan: 


-: Syncope: Secondary to cough and cough induced vasovagal event.  No further 

intervention at this time.


-Acute renal failure secondary to infection with cough with 19 improved with IV 

fluids and ACE inhibitor was reinitiated at a low dose


-Sepsis and pneumonia secondary to Covid 19 patient was hypoxic yesterday.  That

improved today wanted to go home will be discharged today patient is not 

requiring any oxygen


-Rule out pulmonary embolism


-Acute hypoxic respiratory failure secondary to Covid 19 infection


-Hypovolemic hyponatremia: Improved


-Hyperlipidemia


-Hypertension


-Coronary artery disease with cardiac catheterization and patient is presently 

on aspirin and Plavix along with beta blocker and a statin which will be 

resumed.


-DVT prophylaxis with Lovenox








Patient Condition at Discharge: Serious





Plan - Discharge Summary


Discharge Rx Participant: No


New Discharge Prescriptions: 


New


   dexAMETHasone [Hexadrol] 6 mg PO DAILY #6 tab





Continue


   Citalopram Hydrobromide [Citalopram HBr] 40 mg PO DAILY


   Cholecalciferol (Vitamin D3) [Vitamin D3] 2,000 unit PO DAILY


   Omeprazole 40 mg PO DAILY


   Clopidogrel [Plavix] 75 mg PO HS


   Aspirin 325 mg PO DAILY


   Omega-3 Fatty Acids/Fish Oil [Fish Oil 1,000 mg Softgel] 1 cap PO DAILY


   Atorvastatin Calcium [Lipitor] 10 mg PO HS


   Vascepa 2.4gm 2.4 gm PO DAILY


   Metoprolol Tartrate [Lopressor] 100 mg PO DAILY





Changed


   lisinopriL [Zestril] 20 mg PO DAILY #0


Discharge Medication List





Aspirin 325 mg PO DAILY 02/28/18 [History]


Cholecalciferol (Vitamin D3) [Vitamin D3] 2,000 unit PO DAILY 02/28/18 [History]


Citalopram Hydrobromide [Citalopram HBr] 40 mg PO DAILY 02/28/18 [History]


Clopidogrel [Plavix] 75 mg PO HS 02/28/18 [History]


Omeprazole 40 mg PO DAILY 02/28/18 [History]


Omega-3 Fatty Acids/Fish Oil [Fish Oil 1,000 mg Softgel] 1 cap PO DAILY 04/09/19

[History]


Atorvastatin Calcium [Lipitor] 10 mg PO HS 11/17/20 [History]


Metoprolol Tartrate [Lopressor] 100 mg PO DAILY 11/21/20 [History]


Vascepa 2.4gm 2.4 gm PO DAILY 11/21/20 [History]


dexAMETHasone [Hexadrol] 6 mg PO DAILY #6 tab 11/22/20 [Rx]


lisinopriL [Zestril] 20 mg PO DAILY #0 11/22/20 [Rx]








Follow up Appointment(s)/Referral(s): 


Arile Philip MD [Primary Care Provider] - 3 Days

## 2021-02-22 ENCOUNTER — HOSPITAL ENCOUNTER (OUTPATIENT)
Dept: HOSPITAL 47 - LABPAT | Age: 66
Discharge: HOME | End: 2021-02-22
Attending: INTERNAL MEDICINE
Payer: COMMERCIAL

## 2021-02-22 DIAGNOSIS — I71.1: ICD-10-CM

## 2021-02-22 DIAGNOSIS — Z01.818: Primary | ICD-10-CM

## 2021-02-22 LAB
ANION GAP SERPL CALC-SCNC: 11 MMOL/L
BUN SERPL-SCNC: 22 MG/DL (ref 9–20)
CHLORIDE SERPL-SCNC: 101 MMOL/L (ref 98–107)
CO2 SERPL-SCNC: 28 MMOL/L (ref 22–30)
ERYTHROCYTE [DISTWIDTH] IN BLOOD BY AUTOMATED COUNT: 4.44 M/UL (ref 4.3–5.9)
ERYTHROCYTE [DISTWIDTH] IN BLOOD: 14.8 % (ref 11.5–15.5)
HCT VFR BLD AUTO: 40 % (ref 39–53)
HGB BLD-MCNC: 13.3 GM/DL (ref 13–17.5)
MCH RBC QN AUTO: 30 PG (ref 25–35)
MCHC RBC AUTO-ENTMCNC: 33.2 G/DL (ref 31–37)
MCV RBC AUTO: 90.2 FL (ref 80–100)
PLATELET # BLD AUTO: 163 K/UL (ref 150–450)
POTASSIUM SERPL-SCNC: 4.5 MMOL/L (ref 3.5–5.1)
SODIUM SERPL-SCNC: 140 MMOL/L (ref 137–145)
WBC # BLD AUTO: 6.1 K/UL (ref 3.8–10.6)

## 2021-02-22 PROCEDURE — 36415 COLL VENOUS BLD VENIPUNCTURE: CPT

## 2021-02-22 PROCEDURE — 82565 ASSAY OF CREATININE: CPT

## 2021-02-22 PROCEDURE — 85027 COMPLETE CBC AUTOMATED: CPT

## 2021-02-22 PROCEDURE — 80051 ELECTROLYTE PANEL: CPT

## 2021-02-22 PROCEDURE — 84520 ASSAY OF UREA NITROGEN: CPT

## 2021-03-05 ENCOUNTER — HOSPITAL ENCOUNTER (INPATIENT)
Dept: HOSPITAL 47 - 2ORMAIN | Age: 66
LOS: 1 days | Discharge: HOME | DRG: 254 | End: 2021-03-06
Attending: INTERNAL MEDICINE | Admitting: INTERNAL MEDICINE
Payer: COMMERCIAL

## 2021-03-05 VITALS — BODY MASS INDEX: 31.8 KG/M2

## 2021-03-05 DIAGNOSIS — F17.200: ICD-10-CM

## 2021-03-05 DIAGNOSIS — I35.0: ICD-10-CM

## 2021-03-05 DIAGNOSIS — E78.5: ICD-10-CM

## 2021-03-05 DIAGNOSIS — I71.4: Primary | ICD-10-CM

## 2021-03-05 DIAGNOSIS — I25.10: ICD-10-CM

## 2021-03-05 DIAGNOSIS — I10: ICD-10-CM

## 2021-03-05 LAB
ANION GAP SERPL CALC-SCNC: 6 MMOL/L
BASOPHILS # BLD AUTO: 0 K/UL (ref 0–0.2)
BASOPHILS NFR BLD AUTO: 0 %
BUN SERPL-SCNC: 24 MG/DL (ref 9–20)
CALCIUM SPEC-MCNC: 8.2 MG/DL (ref 8.4–10.2)
CHLORIDE SERPL-SCNC: 109 MMOL/L (ref 98–107)
CO2 SERPL-SCNC: 22 MMOL/L (ref 22–30)
EOSINOPHIL # BLD AUTO: 0.2 K/UL (ref 0–0.7)
EOSINOPHIL NFR BLD AUTO: 2 %
ERYTHROCYTE [DISTWIDTH] IN BLOOD BY AUTOMATED COUNT: 3.87 M/UL (ref 4.3–5.9)
ERYTHROCYTE [DISTWIDTH] IN BLOOD: 14.4 % (ref 11.5–15.5)
GLUCOSE BLD-MCNC: 108 MG/DL (ref 75–99)
GLUCOSE SERPL-MCNC: 112 MG/DL (ref 74–99)
HCT VFR BLD AUTO: 35.2 % (ref 39–53)
HGB BLD-MCNC: 12.2 GM/DL (ref 13–17.5)
LYMPHOCYTES # SPEC AUTO: 1 K/UL (ref 1–4.8)
LYMPHOCYTES NFR SPEC AUTO: 12 %
MCH RBC QN AUTO: 31.6 PG (ref 25–35)
MCHC RBC AUTO-ENTMCNC: 34.8 G/DL (ref 31–37)
MCV RBC AUTO: 90.9 FL (ref 80–100)
MONOCYTES # BLD AUTO: 0.5 K/UL (ref 0–1)
MONOCYTES NFR BLD AUTO: 6 %
NEUTROPHILS # BLD AUTO: 6.5 K/UL (ref 1.3–7.7)
NEUTROPHILS NFR BLD AUTO: 79 %
PLATELET # BLD AUTO: 126 K/UL (ref 150–450)
POTASSIUM SERPL-SCNC: 5.2 MMOL/L (ref 3.5–5.1)
SODIUM SERPL-SCNC: 137 MMOL/L (ref 137–145)
WBC # BLD AUTO: 8.2 K/UL (ref 3.8–10.6)

## 2021-03-05 PROCEDURE — 85025 COMPLETE CBC W/AUTO DIFF WBC: CPT

## 2021-03-05 PROCEDURE — 047L3ZZ DILATION OF LEFT FEMORAL ARTERY, PERCUTANEOUS APPROACH: ICD-10-PCS

## 2021-03-05 PROCEDURE — B41D1ZZ FLUOROSCOPY OF AORTA AND BILATERAL LOWER EXTREMITY ARTERIES USING LOW OSMOLAR CONTRAST: ICD-10-PCS

## 2021-03-05 PROCEDURE — 86901 BLOOD TYPING SEROLOGIC RH(D): CPT

## 2021-03-05 PROCEDURE — 85027 COMPLETE CBC AUTOMATED: CPT

## 2021-03-05 PROCEDURE — 04Q04ZZ REPAIR ABDOMINAL AORTA, PERCUTANEOUS ENDOSCOPIC APPROACH: ICD-10-PCS

## 2021-03-05 PROCEDURE — 34705 EVAC RPR A-BIILIAC NDGFT: CPT

## 2021-03-05 PROCEDURE — 86900 BLOOD TYPING SEROLOGIC ABO: CPT

## 2021-03-05 PROCEDURE — 86850 RBC ANTIBODY SCREEN: CPT

## 2021-03-05 PROCEDURE — 80048 BASIC METABOLIC PNL TOTAL CA: CPT

## 2021-03-05 RX ADMIN — CEFAZOLIN SCH MLS/HR: 330 INJECTION, POWDER, FOR SOLUTION INTRAMUSCULAR; INTRAVENOUS at 12:37

## 2021-03-05 RX ADMIN — POTASSIUM CHLORIDE SCH: 14.9 INJECTION, SOLUTION INTRAVENOUS at 12:02

## 2021-03-05 NOTE — P.OP
Description of Procedure: 


Date: 03/05/2021





Preoperative diagnosis: Asymptomatic Infrarenal 5.5 cm AAA


Postoperative diagnosis: Same


Procedure:     1.  Percutaneous Endovascular aortic repair with Alto device.  


                   2.  Ultrasound-guided bilateral common femoral artery access


      3.  Percutaneous transluminal balloon angioplasty of the common femoral 

artery


      4.  Selective left femoral angiogram  


: Jhonatan Tinajero DO


Secondary : Jack Duong MD


Anesthesia: General


Estimated blood loss: 20 mL


Complications: None


Condition: Stable


Disposition: Palpable PT pulse on the right, multiphasic DP and PT signal on the

left





Indications: 65-year-old gentleman with history of coronary artery disease, 

known right carotid artery occlusion and aortic stenosis was found to have a 5.5

cm aneurysm which had increased over the last several months.  He underwent CT 

angiogram of the abdomen and pelvis which demonstrated a long neck which was 

amenable to endovascular aortic repair.  He presents today for such repair.





Operative narrative: After written and informed consent was obtained from the 

patient all risks benefits and complications were described the patient was 

brought to the Cath Lab and laid in a supine position.  The area of the groins 

were prepped and draped in usual sterile fashion after appropriate anesthetic 

was performed per the anesthesiologist.  A timeout was performed in normal 

fashion and antibiotics were administered prior to incisions.  Utilizing 

ultrasound bilateral common femoral arteries were visualized demonstrating 

patency with minimal calcification noted on the right with severe calcification 

and stenosis noted on the left.  Under ultrasound guidance utilizing a 

multipurpose needle bilateral common femoral arteries were accessed and 

guidewire was placed followed by deployment of 2 Perclose closure devices for 

each femoral artery.  The left femoral artery was difficult to place the 

Perclose device and 4 devices were utilized due to 2 of them not taking.  

Utilizing Seldinger technique and 8-Hungarian sheath was then placed and patient 

was administered heparin and followed with ACTs.  035 Glidewire was then placed 

up the and right femoral sheath and exchanged for a Lunderquist wire through an 

angled glide catheter.  The left femoral artery was then utilized and guidewire 

was placed followed by pigtail catheter and aortogram was obtained.  Utilizing 

the Lunderquist wire a 23 mm main body device was then loaded over the guidewire

after the 8-Hungarian sheath was removed.  Delivery system was then placed 1 cm 

proximal to the intended landing site and the aortic body was oriented for 

appropriate access for the contralateral limb.  Delivery system was then 

retracted out of the sheath and the aortic body radiopaque markers were verified

to be in the correct position.  First segment of the graft was then deployed in 

normal fashion by releasing and pulling the knob in normal fashion.  Balloon 

injection port was then inflated utilizing a 4-1 saline contrast mixture in 

order to open the mid crown.  Balloon was then deflated.  Precise positioning 

was then performed with utilizing the radiopaque markers and parallax was 

removed and our to land at the renal arteries.  Pigtail catheter was then 

retracted away from the proximal stent and the proximal stent was released in 

normal fashion.  Polymer was then utilized and filled through the polymer port 

which was visualized under fluoroscopy.  The stiff Lunderquist wire was then 

retracted within the ipsilateral limb.  Attention was then placed to accessing 

the contralateral limb.  Utilizing the Glidewire and angled glide catheter the 

contralateral limb was accessed and pigtail catheter was placed.  Pigtail 

catheter was then spun to verify intragraft cannulation.  A stiff wire was then 

placed within the pigtail catheter and retrograde angiogram was obtained 

demonstrating the internal iliac artery takeoff.  Measurements were obtained and

a 16 x 140 mm Ovation limb was chosen to be deployed and deployed in normal 

fashion.  Once completed the aortic main body was completely deployed in normal 

fashion.  Utilizing the balloon balloon angioplasty was performed at the ring to

further mold the polymer to the aortic neck.  Once completed the aortic body 

deployment sheath was removed in normal fashion.  Pigtail catheter was then p

laced over the Lunderquist wire and retrograde angiogram was obtained with 

measurements to the internal iliac artery on the ipsilateral limb.  A 16 x 120 

mm Ovation limb was chosen and deployed in normal fashion.  Once completed two 

12 x 40mm balloons were placed up each iliac limb and balloon angioplasty was 

performed through its entirety.  Once completed balloons were removed and 

pigtail catheter was placed above the graft and final angiogram was obtained 

demonstrating exclusion of the aneurysm with small 1B endoleak noted and 

therefore utilizing the Q 50 balloon another balloon angioplasty within the 

stent was performed and repeat angiogram demonstrated no evidence of endoleak's.

 All guidewires and catheters were then removed and the Perclose closure devices

were closed in normal fashion.  Blood flow was assessed in the lower extremities

demonstrating palpable pulse in the right DP and PT but diminished flow noted in

the left.  Due to this ultrasound was utilized demonstrating minimal flow across

the existing previous lesion therefore retrograde access of the SFA utilizing 

ultrasound was obtained with a micro-access needle and the lesion was crossed.  

A 5-Hungarian sheath was then placed and retrograde angiogram was obtained 

demonstrating severe stenosis approximately 90% at the common femoral artery.  

035 Glidewire was placed and followed by a 5x40mm which improved slightly and 

therefore a 7 x 20 mm balloon was then utilized demonstrating marked improvement

of the stenosis with good brisk flow distal.  A 5-Hungarian sheath was then removed

and pressure was placed for hemostasis.  The areas were then cleansed and 

dressings were placed.  The patient tolerated procedure well and had  palpable 

pulses on the right and multiphasic signal noted on the left DP and PT and was 

sent to PACU for recovery.

## 2021-03-05 NOTE — IR
EXAMINATION TYPE: IR pta aorta

 

DATE OF EXAM: 3/5/2021

 

COMPARISON: NONE

 

HISTORY: Aortic aneurysm

 

Fluoroscopy support supplied to the referring clinician.  See dictated report from cardiology, 25.5 m
inutes fluoroscopy time, 886 intraoperative C-arm images document the procedure

## 2021-03-06 VITALS
TEMPERATURE: 98.2 F | HEART RATE: 83 BPM | RESPIRATION RATE: 16 BRPM | SYSTOLIC BLOOD PRESSURE: 121 MMHG | DIASTOLIC BLOOD PRESSURE: 75 MMHG

## 2021-03-06 LAB
ANION GAP SERPL CALC-SCNC: 8 MMOL/L
BUN SERPL-SCNC: 18 MG/DL (ref 9–20)
CALCIUM SPEC-MCNC: 8.6 MG/DL (ref 8.4–10.2)
CHLORIDE SERPL-SCNC: 104 MMOL/L (ref 98–107)
CO2 SERPL-SCNC: 25 MMOL/L (ref 22–30)
ERYTHROCYTE [DISTWIDTH] IN BLOOD BY AUTOMATED COUNT: 3.77 M/UL (ref 4.3–5.9)
ERYTHROCYTE [DISTWIDTH] IN BLOOD: 14.8 % (ref 11.5–15.5)
GLUCOSE SERPL-MCNC: 108 MG/DL (ref 74–99)
HCT VFR BLD AUTO: 34.1 % (ref 39–53)
HGB BLD-MCNC: 11.4 GM/DL (ref 13–17.5)
MCH RBC QN AUTO: 30.3 PG (ref 25–35)
MCHC RBC AUTO-ENTMCNC: 33.4 G/DL (ref 31–37)
MCV RBC AUTO: 90.5 FL (ref 80–100)
PLATELET # BLD AUTO: 130 K/UL (ref 150–450)
POTASSIUM SERPL-SCNC: 4.5 MMOL/L (ref 3.5–5.1)
SODIUM SERPL-SCNC: 137 MMOL/L (ref 137–145)
WBC # BLD AUTO: 8.1 K/UL (ref 3.8–10.6)

## 2021-03-06 RX ADMIN — POTASSIUM CHLORIDE SCH: 14.9 INJECTION, SOLUTION INTRAVENOUS at 04:21

## 2021-03-06 RX ADMIN — CEFAZOLIN SCH: 330 INJECTION, POWDER, FOR SOLUTION INTRAMUSCULAR; INTRAVENOUS at 04:21

## 2021-03-06 NOTE — DS
DISCHARGE SUMMARY



DATE OF ADMISSION:

03/05/2021



DATE OF DISCHARGE:

03/06/2021



BRIEF HISTORY:

This is a 66-year-old gentleman who was diagnosed recently with enlarging infrarenal

abdominal aortic aneurysm.



He underwent yesterday successful percutaneous repair of infrarenal abdominal aortic

aneurysm using the Ovation device.



The procedure was complicated by acute vessel closure of the left common femoral artery

which was opened using balloon angioplasty without stenting with good angiographic

results.



The patient was seen this morning.  Both groins are soft and nontender without any

bruises.



He is going to be discharged home on the following medication.

1. Aspirin 325 mg p.o. daily.

2. Atorvastatin 10 mg p.o. q.h.s.

3. Metoprolol 100 mg p.o. b.i.d.

4. Omeprazole 40 mg p.o. daily.

5. Plavix 75 mg p.o. daily.

6. Vascepa 0.5 gram p.o. daily.

7. Lisinopril 20 mg p.o. daily.



The patient is going to be seen in the office next week.  We will do surveillance CTA

regarding the aneurysm.





MMODL / IJN: [id number] / Job#: [id number]

## 2021-10-27 ENCOUNTER — HOSPITAL ENCOUNTER (EMERGENCY)
Dept: HOSPITAL 47 - EC | Age: 66
Discharge: LEFT BEFORE DISCHARGE | End: 2021-10-27
Payer: COMMERCIAL

## 2021-10-27 VITALS
SYSTOLIC BLOOD PRESSURE: 131 MMHG | RESPIRATION RATE: 20 BRPM | TEMPERATURE: 98.8 F | HEART RATE: 94 BPM | DIASTOLIC BLOOD PRESSURE: 89 MMHG

## 2021-10-27 DIAGNOSIS — Z53.21: ICD-10-CM

## 2021-10-27 DIAGNOSIS — Z20.822: Primary | ICD-10-CM

## 2021-10-27 PROCEDURE — 99499 UNLISTED E&M SERVICE: CPT

## 2021-10-27 PROCEDURE — 87635 SARS-COV-2 COVID-19 AMP PRB: CPT

## 2021-11-01 ENCOUNTER — HOSPITAL ENCOUNTER (OUTPATIENT)
Dept: HOSPITAL 47 - EC | Age: 66
Setting detail: OBSERVATION
LOS: 1 days | Discharge: HOME | End: 2021-11-02
Payer: COMMERCIAL

## 2021-11-01 DIAGNOSIS — S30.1XXA: ICD-10-CM

## 2021-11-01 DIAGNOSIS — R73.02: ICD-10-CM

## 2021-11-01 DIAGNOSIS — N17.9: ICD-10-CM

## 2021-11-01 DIAGNOSIS — K76.0: ICD-10-CM

## 2021-11-01 DIAGNOSIS — R06.02: ICD-10-CM

## 2021-11-01 DIAGNOSIS — Z79.899: ICD-10-CM

## 2021-11-01 DIAGNOSIS — Z88.8: ICD-10-CM

## 2021-11-01 DIAGNOSIS — Z87.891: ICD-10-CM

## 2021-11-01 DIAGNOSIS — Z85.818: ICD-10-CM

## 2021-11-01 DIAGNOSIS — F41.3: ICD-10-CM

## 2021-11-01 DIAGNOSIS — E66.9: ICD-10-CM

## 2021-11-01 DIAGNOSIS — Z82.49: ICD-10-CM

## 2021-11-01 DIAGNOSIS — R41.3: ICD-10-CM

## 2021-11-01 DIAGNOSIS — Z20.822: ICD-10-CM

## 2021-11-01 DIAGNOSIS — I10: ICD-10-CM

## 2021-11-01 DIAGNOSIS — D64.9: ICD-10-CM

## 2021-11-01 DIAGNOSIS — Z79.02: ICD-10-CM

## 2021-11-01 DIAGNOSIS — R10.12: ICD-10-CM

## 2021-11-01 DIAGNOSIS — I71.4: ICD-10-CM

## 2021-11-01 DIAGNOSIS — Z90.49: ICD-10-CM

## 2021-11-01 DIAGNOSIS — E78.5: ICD-10-CM

## 2021-11-01 DIAGNOSIS — Z71.9: ICD-10-CM

## 2021-11-01 DIAGNOSIS — Z86.711: ICD-10-CM

## 2021-11-01 DIAGNOSIS — I97.89: ICD-10-CM

## 2021-11-01 DIAGNOSIS — Z86.79: ICD-10-CM

## 2021-11-01 DIAGNOSIS — R10.32: Primary | ICD-10-CM

## 2021-11-01 DIAGNOSIS — Z79.82: ICD-10-CM

## 2021-11-01 DIAGNOSIS — I25.10: ICD-10-CM

## 2021-11-01 DIAGNOSIS — Z86.73: ICD-10-CM

## 2021-11-01 DIAGNOSIS — R05.9: ICD-10-CM

## 2021-11-01 DIAGNOSIS — R10.33: ICD-10-CM

## 2021-11-01 DIAGNOSIS — I65.29: ICD-10-CM

## 2021-11-01 DIAGNOSIS — Z82.3: ICD-10-CM

## 2021-11-01 DIAGNOSIS — Z98.890: ICD-10-CM

## 2021-11-01 DIAGNOSIS — Z86.718: ICD-10-CM

## 2021-11-01 LAB
ALBUMIN SERPL-MCNC: 4.7 G/DL (ref 3.5–5)
ALP SERPL-CCNC: 69 U/L (ref 38–126)
ALT SERPL-CCNC: 45 U/L (ref 4–49)
AMYLASE SERPL-CCNC: 93 U/L (ref 30–110)
ANION GAP SERPL CALC-SCNC: 13 MMOL/L
APTT BLD: 23.2 SEC (ref 22–30)
AST SERPL-CCNC: 41 U/L (ref 17–59)
BASOPHILS # BLD AUTO: 0 K/UL (ref 0–0.2)
BASOPHILS NFR BLD AUTO: 0 %
BUN SERPL-SCNC: 26 MG/DL (ref 9–20)
CALCIUM SPEC-MCNC: 9.6 MG/DL (ref 8.4–10.2)
CHLORIDE SERPL-SCNC: 106 MMOL/L (ref 98–107)
CO2 SERPL-SCNC: 18 MMOL/L (ref 22–30)
EOSINOPHIL # BLD AUTO: 0.5 K/UL (ref 0–0.7)
EOSINOPHIL NFR BLD AUTO: 8 %
ERYTHROCYTE [DISTWIDTH] IN BLOOD BY AUTOMATED COUNT: 3.77 M/UL (ref 4.3–5.9)
ERYTHROCYTE [DISTWIDTH] IN BLOOD: 14.3 % (ref 11.5–15.5)
GLUCOSE SERPL-MCNC: 111 MG/DL (ref 74–99)
HCT VFR BLD AUTO: 34.7 % (ref 39–53)
HGB BLD-MCNC: 11.5 GM/DL (ref 13–17.5)
INR PPP: 0.9 (ref ?–1.2)
LIPASE SERPL-CCNC: 219 U/L (ref 23–300)
LYMPHOCYTES # SPEC AUTO: 1.5 K/UL (ref 1–4.8)
LYMPHOCYTES NFR SPEC AUTO: 22 %
MCH RBC QN AUTO: 30.6 PG (ref 25–35)
MCHC RBC AUTO-ENTMCNC: 33.2 G/DL (ref 31–37)
MCV RBC AUTO: 92.1 FL (ref 80–100)
MONOCYTES # BLD AUTO: 0.5 K/UL (ref 0–1)
MONOCYTES NFR BLD AUTO: 7 %
NEUTROPHILS # BLD AUTO: 4.4 K/UL (ref 1.3–7.7)
NEUTROPHILS NFR BLD AUTO: 62 %
PH UR: 5.5 [PH] (ref 5–8)
PLATELET # BLD AUTO: 219 K/UL (ref 150–450)
POTASSIUM SERPL-SCNC: 5 MMOL/L (ref 3.5–5.1)
PROT SERPL-MCNC: 7.7 G/DL (ref 6.3–8.2)
PT BLD: 10 SEC (ref 9–12)
SODIUM SERPL-SCNC: 137 MMOL/L (ref 137–145)
SP GR UR: 1.02 (ref 1–1.03)
UROBILINOGEN UR QL STRIP: <2 MG/DL (ref ?–2)
WBC # BLD AUTO: 7.1 K/UL (ref 3.8–10.6)

## 2021-11-01 PROCEDURE — 74177 CT ABD & PELVIS W/CONTRAST: CPT

## 2021-11-01 PROCEDURE — 86900 BLOOD TYPING SEROLOGIC ABO: CPT

## 2021-11-01 PROCEDURE — 83690 ASSAY OF LIPASE: CPT

## 2021-11-01 PROCEDURE — 99285 EMERGENCY DEPT VISIT HI MDM: CPT

## 2021-11-01 PROCEDURE — 85610 PROTHROMBIN TIME: CPT

## 2021-11-01 PROCEDURE — 83605 ASSAY OF LACTIC ACID: CPT

## 2021-11-01 PROCEDURE — 96361 HYDRATE IV INFUSION ADD-ON: CPT

## 2021-11-01 PROCEDURE — 36415 COLL VENOUS BLD VENIPUNCTURE: CPT

## 2021-11-01 PROCEDURE — 85730 THROMBOPLASTIN TIME PARTIAL: CPT

## 2021-11-01 PROCEDURE — 81003 URINALYSIS AUTO W/O SCOPE: CPT

## 2021-11-01 PROCEDURE — 96374 THER/PROPH/DIAG INJ IV PUSH: CPT

## 2021-11-01 PROCEDURE — 96375 TX/PRO/DX INJ NEW DRUG ADDON: CPT

## 2021-11-01 PROCEDURE — 74174 CTA ABD&PLVS W/CONTRAST: CPT

## 2021-11-01 PROCEDURE — 86850 RBC ANTIBODY SCREEN: CPT

## 2021-11-01 PROCEDURE — 80048 BASIC METABOLIC PNL TOTAL CA: CPT

## 2021-11-01 PROCEDURE — 96376 TX/PRO/DX INJ SAME DRUG ADON: CPT

## 2021-11-01 PROCEDURE — 80053 COMPREHEN METABOLIC PANEL: CPT

## 2021-11-01 PROCEDURE — 86901 BLOOD TYPING SEROLOGIC RH(D): CPT

## 2021-11-01 PROCEDURE — 87635 SARS-COV-2 COVID-19 AMP PRB: CPT

## 2021-11-01 PROCEDURE — 85025 COMPLETE CBC W/AUTO DIFF WBC: CPT

## 2021-11-01 PROCEDURE — 82150 ASSAY OF AMYLASE: CPT

## 2021-11-01 PROCEDURE — 71046 X-RAY EXAM CHEST 2 VIEWS: CPT

## 2021-11-01 RX ADMIN — CEFAZOLIN SCH: 330 INJECTION, POWDER, FOR SOLUTION INTRAMUSCULAR; INTRAVENOUS at 22:52

## 2021-11-01 RX ADMIN — MORPHINE SULFATE PRN MG: 4 INJECTION, SOLUTION INTRAMUSCULAR; INTRAVENOUS at 19:53

## 2021-11-01 RX ADMIN — CEFAZOLIN SCH MLS/HR: 330 INJECTION, POWDER, FOR SOLUTION INTRAMUSCULAR; INTRAVENOUS at 17:40

## 2021-11-01 NOTE — CT
EXAMINATION TYPE: CT abdomen pelvis w con

 

DATE OF EXAM: 11/1/2021

 

COMPARISON: CT 11/17/2020

 

HISTORY: Cough and Mid to low abdominal bruising without injury.

 

CT DLP: 1197.6 mGycm

Automated exposure control for dose reduction was used.

 

TECHNIQUE:  Helical acquisition of images from the lung bases through the pelvis have been completed.


 

CONTRAST: 

Performed without Oral Contrast and with IV Contrast, patient injected with 100 mL of Isovue 300.

 

FINDINGS: Postop changes noted to the anterior abdominal wall, increased density within the subcutane
ous fat over the anterior abdomen is likely due to ecchymosis.

 

LUNG BASES: No significant abnormality is appreciated.

 

AORTA:  Patient shows aortic stent graft change, the limbs of the graft appear to enhance, there is l
uminal increased density within the aneurysm, outside of the limbs of the graft, the aneurysm measure
s 5.7 cm as compared to prior when it measured 5.6 cm..

 

LIVER/GB: Liver shows low attenuation likely due to hepatic steatosis, patient is post cholecystectom
y

 

PANCREAS: No significant abnormality is seen.

 

SPLEEN: No significant abnormality is seen.

 

ADRENALS: No significant abnormality is seen.

 

KIDNEYS: No significant change is seen, cortical cyst noted associated with the anterior aspect of th
e right kidney.

 

 

REPRODUCTIVE ORGANS: No significant abnormality is seen

 

BOWEL:  There is some small bowel wall thickening which could be due to an enteritis

 

FREE AIR:  No Free Air visible.

 

ASCITES:  None visible.

 

PELVIC ADENOPATHY:  None visualized.

 

RETROPERITONEAL ADENOPATHY:  No Retroperitoneal Adenopathy visible.

 

URINARY BLADDER:  No significant abnormality is seen.

 

OSSEOUS STRUCTURES:  Degenerative disc changes are present visualized spine, there is some facet arth
ropathy change in the lumbar spine.

 

IMPRESSION: 

ENLARGING ANEURYSM WITH EVIDENCE OF ENDOLEAK, CONSIDER VASCULAR SURGERY CONSULT. Hepatic steatosis. P
robable superficial ecchymosis over the anterior abdomen, postop change

## 2021-11-01 NOTE — P.GSCN
History of Present Illness


Consult date: 21


Reason for Consult: 





history of AAA and EVAR with possible endoleak


History of present illness: 





66 year old male with history of AAA and EVAR in March presents to the ER 

secondary to abdominal pain onset 3 days ago.  He states he has been coughing 

over the last several days and noticed bruising along his abdomen after his pain

started.  He thought he had COVID and was tested 2 days ago which was negative. 

He denies any back pain, pain with ambulation in his legs, fevers, chills or 

chest pain.





Review of Systems


All systems: negative (what is mentioned in the PMH or HPI)





Past Medical History


Past Medical History: Cancer, CVA/TIA, Hyperlipidemia, Hypertension


Additional Past Medical History / Comment(s): "rt carotid artery 100% blocked", 

past "stroke"  some mild short term memory problems", throat polyps removed pt 

stated were cancerous, ANEURYSM  ("CHEST AREA")- DR WATCHING IT"


History of Any Multi-Drug Resistant Organisms: None Reported


Past Surgical History: Back Surgery, Cholecystectomy, Heart Catheterization, 

Hernia Repair


Additional Past Surgical History / Comment(s): carolynn inguinal hernia repair, 

throat polpys removed


Past Anesthesia/Blood Transfusion Reactions: Previous Problems w/ Anesthesia, 

Motion Sickness


Additional Past Anesthesia/Blood Transfusion Reaction / Comm: STATES TAKES LONGE

R TO WAKE UP"


Past Psychological History: Depression


Smoking Status: Former smoker


Past Alcohol Use History: None Reported


Past Drug Use History: None Reported





- Past Family History


  ** Mother


Family Medical History: CVA/TIA





  ** Father


Family Medical History: CVA/TIA, Myocardial Infarction (MI)


Additional Family Medical History / Comment(s): Father  of a MI while in his

60s or 70s





Medications and Allergies


                                Home Medications











 Medication  Instructions  Recorded  Confirmed  Type


 


Aspirin 325 mg PO DAILY 18 History


 


Cholecalciferol (Vitamin D3) 2,000 unit PO DAILY 18 History





[Vitamin D3]    


 


Citalopram Hydrobromide 40 mg PO DAILY 18 History





[Citalopram HBr]    


 


Clopidogrel [Plavix] 75 mg PO HS 18 History


 


Omeprazole 40 mg PO DAILY 18 History


 


Omega-3 Fatty Acids/Fish Oil [Fish 1 cap PO DAILY 19 History





Oil 1,000 mg Softgel]    


 


Atorvastatin Calcium [Lipitor] 10 mg PO HS 20 History


 


Icosapent Ethyl [Vascepa] 2.4 gm PO DAILY #0 20 History


 


Metoprolol Tartrate [Lopressor] 100 mg PO DAILY 20 History


 


lisinopriL [Zestril] 20 mg PO BID 21 History








                                    Allergies











Allergy/AdvReac Type Severity Reaction Status Date / Time


 


hydrochlorothiazide Allergy  Rash/Hives Verified 21 13:10














Surgical - Exam


                                   Vital Signs











Temp Pulse Resp BP Pulse Ox


 


 98.4 F   92   18   131/74   98 


 


 21 13:07  21 13:07  21 13:07  21 13:07  21 13:07














- General


well developed, well nourished, no distress





- Eyes


PERRL, normal ocular movement





- ENT


normal pinna, normal nares





- Neck


no masses, no bruits





- Respiratory


normal expansion





- Cardiovascular


Rhythm: regular





- Abdomen





tense, echymosis at the umbilicus and left flank/ lower quadrant area


Abdomen: tender, distended





- Integumentary


no rash





- Neurologic


normal coordination, normal sensation





- Musculoskeletal


normal gait





- Psychiatric


oriented to time, oriented to person, oriented to place, speech is normal





palpable DP and PT pulses bilaterally.





Results





- Labs





                                 21 13:57





                                 21 13:57


                  Abnormal Lab Results - Last 24 Hours (Table)











  21 Range/Units





  13:57 13:57 


 


RBC  3.77 L   (4.30-5.90)  m/uL


 


Hgb  11.5 L   (13.0-17.5)  gm/dL


 


Hct  34.7 L   (39.0-53.0)  %


 


Carbon Dioxide   18 L  (22-30)  mmol/L


 


BUN   26 H  (9-20)  mg/dL


 


Creatinine   1.48 H  (0.66-1.25)  mg/dL


 


Glucose   111 H  (74-99)  mg/dL








                                 Diabetes panel











  21 Range/Units





  13:57 


 


Sodium  137  (137-145)  mmol/L


 


Potassium  5.0  (3.5-5.1)  mmol/L


 


Chloride  106  ()  mmol/L


 


Carbon Dioxide  18 L  (22-30)  mmol/L


 


BUN  26 H  (9-20)  mg/dL


 


Creatinine  1.48 H  (0.66-1.25)  mg/dL


 


Glucose  111 H  (74-99)  mg/dL


 


Calcium  9.6  (8.4-10.2)  mg/dL


 


AST  41  (17-59)  U/L


 


ALT  45  (4-49)  U/L


 


Alkaline Phosphatase  69  ()  U/L


 


Total Protein  7.7  (6.3-8.2)  g/dL


 


Albumin  4.7  (3.5-5.0)  g/dL








                                  Calcium panel











  21 Range/Units





  13:57 


 


Calcium  9.6  (8.4-10.2)  mg/dL


 


Albumin  4.7  (3.5-5.0)  g/dL








                                 Pituitary panel











  21 Range/Units





  13:57 


 


Sodium  137  (137-145)  mmol/L


 


Potassium  5.0  (3.5-5.1)  mmol/L


 


Chloride  106  ()  mmol/L


 


Carbon Dioxide  18 L  (22-30)  mmol/L


 


BUN  26 H  (9-20)  mg/dL


 


Creatinine  1.48 H  (0.66-1.25)  mg/dL


 


Glucose  111 H  (74-99)  mg/dL


 


Calcium  9.6  (8.4-10.2)  mg/dL








                                  Adrenal panel











  21 Range/Units





  13:57 


 


Sodium  137  (137-145)  mmol/L


 


Potassium  5.0  (3.5-5.1)  mmol/L


 


Chloride  106  ()  mmol/L


 


Carbon Dioxide  18 L  (22-30)  mmol/L


 


BUN  26 H  (9-20)  mg/dL


 


Creatinine  1.48 H  (0.66-1.25)  mg/dL


 


Glucose  111 H  (74-99)  mg/dL


 


Calcium  9.6  (8.4-10.2)  mg/dL


 


Total Bilirubin  0.7  (0.2-1.3)  mg/dL


 


AST  41  (17-59)  U/L


 


ALT  45  (4-49)  U/L


 


Alkaline Phosphatase  69  ()  U/L


 


Total Protein  7.7  (6.3-8.2)  g/dL


 


Albumin  4.7  (3.5-5.0)  g/dL














- Imaging


CT scan - abdomen: report reviewed, image reviewed (possible endoleak noted- 

would need CTA for better diagnostic ability for endoleak)





Assessment and Plan


Assessment: 





1.  Abdominal pain with echymosis possible muscle injury


2.  AAA with history of EVAR


3.  Possible endoleak


4.  Acute kidney injury


Plan: 





Reviewed CT abdomen and pelvis with the patient.  Would recommend CTA for better

 visualization of possible endoleak.  Unlikely patients pain and symptoms from 

aortic endoleak.  No surgical intervention at this time.  Due to increase 

creatinine I would recommend hydration prior to CTA.  Thank you for the 

consultation.

## 2021-11-01 NOTE — ED
General Adult HPI





- General


Source: patient, RN notes reviewed, old records reviewed


Mode of arrival: ambulatory


Limitations: no limitations





<Samuel Phillip - Last Filed: 21 11:19>





<Margoth Encinas - Last Filed: 21 14:48>





- General


Chief complaint: Abdominal Pain


Stated complaint: ABD Pain


Time Seen by Provider: 21 13:22





- History of Present Illness


Initial comments: 





I evaluated the patient when he was placed in a room.  Patient is a 66-year-old 

male with past medical history remarkable for cancer, CVA, hypertension, 

hyperlipidemia, AAA repair earlier this year who is on Plavix and aspirin 

presents emergency Department complaining of bruising over his abdomen over the 

last 3 days.  Has noticed worsening abdominal bruising.  Over the last 3 days.  

He endorses pain at the site of bruising.  Denies any trauma, denies any other 

blood thinners.  Denies any urinary complaints, nausea, vomiting, diarrhea.  

Denies any abdominal pain otherwise.  His no chest pain or shortness of breath. 

Has been dealing with what he describes as an "cold" and has been tested for 

COVID-19 2 days ago and was negative.  He states that the bruising is worse with

coughing.  This slowly got endorses which is why he presents as a primary for 

further evaluation.  Denies any testicular pain, scrotal pain, urinary 

complaints or penile discharge.  His no other acute complaints at this time.  He

was not vaccinated for Covid. (Samuel Phillip)





- Related Data


                                Home Medications











 Medication  Instructions  Recorded  Confirmed


 


Aspirin 325 mg PO DAILY 18


 


Cholecalciferol (Vitamin D3) 2,000 unit PO DAILY 18





[Vitamin D3]   


 


Citalopram Hydrobromide 40 mg PO DAILY 18





[Citalopram HBr]   


 


Clopidogrel [Plavix] 75 mg PO DAILY 18


 


Omeprazole 40 mg PO DAILY 18


 


Atorvastatin Calcium [Lipitor] 10 mg PO HS 20


 


Icosapent Ethyl [Vascepa] 2 gm PO DAILY #0 20


 


Metoprolol Tartrate [Lopressor] 100 mg PO DAILY 20


 


lisinopriL [Zestril] 20 mg PO BID 21


 


Furosemide [Lasix] 20 mg PO DAILY PRN 21


 


Hydrochlorothiazide 12.5 mg PO HS 21





[hydroCHLOROthiazide]   


 


hydrOXYzine pamoate [hydrOXYzine 25 mg PO QID PRN 21





PAMOATE]   








                                  Previous Rx's











 Medication  Instructions  Recorded


 


HYDROcodone/APAP 5-325MG [Norco 1 tab PO Q6HR PRN 3 Days #12 tab 21





5-325]  











                                    Allergies











Allergy/AdvReac Type Severity Reaction Status Date / Time


 


hydrochlorothiazide AdvReac  Itching Verified 21 16:51














Review of Systems


ROS Other: All systems not noted in ROS Statement are negative.





<Samuel Phillip - Last Filed: 21 11:19>


ROS Other: All systems not noted in ROS Statement are negative.





<Margoth Encinas - Last Filed: 21 14:48>


ROS Statement: 


Those systems with pertinent positive or pertinent negative responses have been 

documented in the HPI.


Review of Systems:


CONST: Denies fever 


EYES: Denies blurry vision 


ENT: Denies nasal congestion  


C/V:  Denies Chest pain


RESP: Denies shortness of breath 


GI: Endorses abdominal pain, bruising


: Denies dysuria  


SKIN: Denies rash.


MSK: Denies joint pain.


NEURO: Denies headache  (Samuel Phillip)





Past Medical History


Past Medical History: Cancer, CVA/TIA, Hyperlipidemia, Hypertension


Additional Past Medical History / Comment(s): "rt carotid artery 100% blocked", 

past "stroke"  some mild short term memory problems", throat polyps removed pt 

stated were cancerous, ANEURYSM  ("CHEST AREA")- DR WATCHING IT"


History of Any Multi-Drug Resistant Organisms: None Reported


Past Surgical History: Back Surgery, Cholecystectomy, Heart Catheterization, 

Hernia Repair


Additional Past Surgical History / Comment(s): carolynn inguinal hernia repair, 

throat polpys removed


Past Anesthesia/Blood Transfusion Reactions: Previous Problems w/ Anesthesia, 

Motion Sickness


Additional Past Anesthesia/Blood Transfusion Reaction / Comment(s): STATES TAKES

LONGER TO WAKE UP"


Past Psychological History: Depression


Smoking Status: Former smoker


Past Alcohol Use History: None Reported


Past Drug Use History: None Reported





- Past Family History


  ** Mother


Family Medical History: CVA/TIA





  ** Father


Family Medical History: CVA/TIA, Myocardial Infarction (MI)


Additional Family Medical History / Comment(s): Father  of a MI while in his

60s or 70s





<Samuel Phillip - Last Filed: 21 11:19>





General Exam


Limitations: no limitations





<Samuel Phillip - Last Filed: 21 11:19>





- General Exam Comments


Initial Comments: 





General: Appears in no acute distress.


HEAD:  Normal with no signs of head trauma.


EYES:  PERRLA, EOMI, conjunctiva normal, no discharge.


ENT:  Hearing grossly intact, normal oropharynx.


RESPIRATORY:  Clear breath sounds bilaterally.  No wheezes, rales, or rhonchi.  


C/V:  Regular rate and rhythm. S1 and S2 auscultated, no edema, peripheral 

pulses 2+ and intact throughout


ABD: Abdomen is soft, nondistended.  Patient is tender to palpation over the sit

es of bruising but nowhere else.  No guarding.  No peritoneal signs.  No rebound

tenderness.


EXT: Normal range of motion, no obvious deformity


SKIN: His periumbilical bruising as well as left sided bruising along the 

inferior aspect of the abdomen radiating towards the flank.  It is purple in 

color.  Tender to palpation.


NEURO: Alert and oriented 4.  No focal deficits. (Samuel Phillip)





Course


                                   Vital Signs











  21





  13:07 15:19 17:42


 


Temperature 98.4 F 98.3 F 


 


Pulse Rate 92 89 94


 


Pulse Rate [   





Left Pulse   





Oximetery]   


 


Respiratory 18 16 16





Rate   


 


Blood Pressure 131/74 152/70 138/69


 


Blood Pressure   





[Left Arm]   


 


O2 Sat by Pulse 98 98 94 L





Oximetry   














  21





  20:00


 


Temperature 98.1 F


 


Pulse Rate 


 


Pulse Rate [ 74





Left Pulse 





Oximetery] 


 


Respiratory 18





Rate 


 


Blood Pressure 


 


Blood Pressure 142/70





[Left Arm] 


 


O2 Sat by Pulse 93 L





Oximetry 














Medical Decision Making





- Lab Data


Result diagrams: 


                                 21 05:35





                                 21 05:35





<Samuel Phillip - Last Filed: 21 11:19>





- Lab Data


Result diagrams: 


                                 21 05:35





                                 21 05:35





<Margoth Encinas - Last Filed: 21 14:48>





- Medical Decision Making





Based on the patient's presentation and physical exam, he does have abdominal 

bruising of unknown etiology.  He has no other abdominal pain or trauma to ex

plain this.  Therefore we will obtain abdominal laboratory studies as well as CT

imaging to rule out serious causes of bleeding.  Patient was in agreement with 

this plan.





Patient's lavatory studies are remarkable for a normocytic anemia with a 

hemoglobin 11.5 which is stable for him.  Patient has an elevated BUN/creatinine

with a creatinine of 1.48 likely secondary to an KEILA.  Urinalysis is negative 

for acute infection.  The remainder of the labs are unremarkable.





CT imaging is remarkable for an enlarging aneurysm with evidence of endoleak.  

Aneurysm is now 5.7 cm versus prior 5.6 cm.  Patient does have a AAA repair with

Dr. Tinajero from earlier this year.  No other acute process are visualized.





Due to the patient's bruising, which appear to be gray Agustin sign and: Sign as 

well as the CT imaging, I will consult vascular surgery.  I spoke with Dr. Weiss who is on-call for vascular surgery who wants to discuss the case with

the patient's surgeon.





 Patient signed out to oncoming emergency department Dr. Dr. Encinas in serious 

condition.  Disposition is pending results of CT imaging. (Samuel Phillip)


Dr. Tinajero came to the emergency room and evaluated the patient.  Requesting 

that the patient have a CT angiography done tomorrow after he receives fluids.  

Did discuss this with the patient who agreed to admission with CT to be 

performed in the morning.  Patient will be admitted to [facility] (Margoth Encinas)





- Lab Data


                                   Lab Results











  21 Range/Units





  13:57 13:57 13:57 


 


WBC  7.1    (3.8-10.6)  k/uL


 


RBC  3.77 L    (4.30-5.90)  m/uL


 


Hgb  11.5 L    (13.0-17.5)  gm/dL


 


Hct  34.7 L    (39.0-53.0)  %


 


MCV  92.1    (80.0-100.0)  fL


 


MCH  30.6    (25.0-35.0)  pg


 


MCHC  33.2    (31.0-37.0)  g/dL


 


RDW  14.3    (11.5-15.5)  %


 


Plt Count  219    (150-450)  k/uL


 


MPV  8.0    


 


Neutrophils %  62    %


 


Lymphocytes %  22    %


 


Monocytes %  7    %


 


Eosinophils %  8    %


 


Basophils %  0    %


 


Neutrophils #  4.4    (1.3-7.7)  k/uL


 


Lymphocytes #  1.5    (1.0-4.8)  k/uL


 


Monocytes #  0.5    (0-1.0)  k/uL


 


Eosinophils #  0.5    (0-0.7)  k/uL


 


Basophils #  0.0    (0-0.2)  k/uL


 


PT   10.0   (9.0-12.0)  sec


 


INR   0.9   (<1.2)  


 


APTT   23.2   (22.0-30.0)  sec


 


Sodium     (137-145)  mmol/L


 


Potassium     (3.5-5.1)  mmol/L


 


Chloride     ()  mmol/L


 


Carbon Dioxide     (22-30)  mmol/L


 


Anion Gap     mmol/L


 


BUN     (9-20)  mg/dL


 


Creatinine     (0.66-1.25)  mg/dL


 


Est GFR (CKD-EPI)AfAm     (>60 ml/min/1.73 sqM)  


 


Est GFR (CKD-EPI)NonAf     (>60 ml/min/1.73 sqM)  


 


Glucose     (74-99)  mg/dL


 


Plasma Lactic Acid Sherif     (0.7-2.0)  mmol/L


 


Calcium     (8.4-10.2)  mg/dL


 


Total Bilirubin     (0.2-1.3)  mg/dL


 


AST     (17-59)  U/L


 


ALT     (4-49)  U/L


 


Alkaline Phosphatase     ()  U/L


 


Total Protein     (6.3-8.2)  g/dL


 


Albumin     (3.5-5.0)  g/dL


 


Amylase     ()  U/L


 


Lipase     ()  U/L


 


Urine Color    Yellow  


 


Urine Appearance    Clear  (Clear)  


 


Urine pH    5.5  (5.0-8.0)  


 


Ur Specific Gravity    1.019  (1.001-1.035)  


 


Urine Protein    Negative  (Negative)  


 


Urine Glucose (UA)    Negative  (Negative)  


 


Urine Ketones    Negative  (Negative)  


 


Urine Blood    Negative  (Negative)  


 


Urine Nitrite    Negative  (Negative)  


 


Urine Bilirubin    Negative  (Negative)  


 


Urine Urobilinogen    <2.0  (<2.0)  mg/dL


 


Ur Leukocyte Esterase    Negative  (Negative)  


 


Coronavirus (PCR)     (Not Detectd)  


 


Blood Type     


 


Blood Type Recheck     


 


Bld Type Recheck Status     


 


Antibody Screen     


 


Spec Expiration Date     














  21 Range/Units





  13:57 13:57 13:57 


 


WBC     (3.8-10.6)  k/uL


 


RBC     (4.30-5.90)  m/uL


 


Hgb     (13.0-17.5)  gm/dL


 


Hct     (39.0-53.0)  %


 


MCV     (80.0-100.0)  fL


 


MCH     (25.0-35.0)  pg


 


MCHC     (31.0-37.0)  g/dL


 


RDW     (11.5-15.5)  %


 


Plt Count     (150-450)  k/uL


 


MPV     


 


Neutrophils %     %


 


Lymphocytes %     %


 


Monocytes %     %


 


Eosinophils %     %


 


Basophils %     %


 


Neutrophils #     (1.3-7.7)  k/uL


 


Lymphocytes #     (1.0-4.8)  k/uL


 


Monocytes #     (0-1.0)  k/uL


 


Eosinophils #     (0-0.7)  k/uL


 


Basophils #     (0-0.2)  k/uL


 


PT     (9.0-12.0)  sec


 


INR     (<1.2)  


 


APTT     (22.0-30.0)  sec


 


Sodium  137    (137-145)  mmol/L


 


Potassium  5.0    (3.5-5.1)  mmol/L


 


Chloride  106    ()  mmol/L


 


Carbon Dioxide  18 L    (22-30)  mmol/L


 


Anion Gap  13    mmol/L


 


BUN  26 H    (9-20)  mg/dL


 


Creatinine  1.48 H    (0.66-1.25)  mg/dL


 


Est GFR (CKD-EPI)AfAm  57    (>60 ml/min/1.73 sqM)  


 


Est GFR (CKD-EPI)NonAf  49    (>60 ml/min/1.73 sqM)  


 


Glucose  111 H    (74-99)  mg/dL


 


Plasma Lactic Acid Sherif   1.3   (0.7-2.0)  mmol/L


 


Calcium  9.6    (8.4-10.2)  mg/dL


 


Total Bilirubin  0.7    (0.2-1.3)  mg/dL


 


AST  41    (17-59)  U/L


 


ALT  45    (4-49)  U/L


 


Alkaline Phosphatase  69    ()  U/L


 


Total Protein  7.7    (6.3-8.2)  g/dL


 


Albumin  4.7    (3.5-5.0)  g/dL


 


Amylase  93    ()  U/L


 


Lipase  219    ()  U/L


 


Urine Color     


 


Urine Appearance     (Clear)  


 


Urine pH     (5.0-8.0)  


 


Ur Specific Gravity     (1.001-1.035)  


 


Urine Protein     (Negative)  


 


Urine Glucose (UA)     (Negative)  


 


Urine Ketones     (Negative)  


 


Urine Blood     (Negative)  


 


Urine Nitrite     (Negative)  


 


Urine Bilirubin     (Negative)  


 


Urine Urobilinogen     (<2.0)  mg/dL


 


Ur Leukocyte Esterase     (Negative)  


 


Coronavirus (PCR)     (Not Detectd)  


 


Blood Type    O Positive  


 


Blood Type Recheck    O Pos  


 


Bld Type Recheck Status    No  


 


Antibody Screen    NEGATIVE  


 


Spec Expiration Date    2021 - 23 Range/Units





  16:36 


 


WBC   (3.8-10.6)  k/uL


 


RBC   (4.30-5.90)  m/uL


 


Hgb   (13.0-17.5)  gm/dL


 


Hct   (39.0-53.0)  %


 


MCV   (80.0-100.0)  fL


 


MCH   (25.0-35.0)  pg


 


MCHC   (31.0-37.0)  g/dL


 


RDW   (11.5-15.5)  %


 


Plt Count   (150-450)  k/uL


 


MPV   


 


Neutrophils %   %


 


Lymphocytes %   %


 


Monocytes %   %


 


Eosinophils %   %


 


Basophils %   %


 


Neutrophils #   (1.3-7.7)  k/uL


 


Lymphocytes #   (1.0-4.8)  k/uL


 


Monocytes #   (0-1.0)  k/uL


 


Eosinophils #   (0-0.7)  k/uL


 


Basophils #   (0-0.2)  k/uL


 


PT   (9.0-12.0)  sec


 


INR   (<1.2)  


 


APTT   (22.0-30.0)  sec


 


Sodium   (137-145)  mmol/L


 


Potassium   (3.5-5.1)  mmol/L


 


Chloride   ()  mmol/L


 


Carbon Dioxide   (22-30)  mmol/L


 


Anion Gap   mmol/L


 


BUN   (9-20)  mg/dL


 


Creatinine   (0.66-1.25)  mg/dL


 


Est GFR (CKD-EPI)AfAm   (>60 ml/min/1.73 sqM)  


 


Est GFR (CKD-EPI)NonAf   (>60 ml/min/1.73 sqM)  


 


Glucose   (74-99)  mg/dL


 


Plasma Lactic Acid Sherif   (0.7-2.0)  mmol/L


 


Calcium   (8.4-10.2)  mg/dL


 


Total Bilirubin   (0.2-1.3)  mg/dL


 


AST   (17-59)  U/L


 


ALT   (4-49)  U/L


 


Alkaline Phosphatase   ()  U/L


 


Total Protein   (6.3-8.2)  g/dL


 


Albumin   (3.5-5.0)  g/dL


 


Amylase   ()  U/L


 


Lipase   ()  U/L


 


Urine Color   


 


Urine Appearance   (Clear)  


 


Urine pH   (5.0-8.0)  


 


Ur Specific Gravity   (1.001-1.035)  


 


Urine Protein   (Negative)  


 


Urine Glucose (UA)   (Negative)  


 


Urine Ketones   (Negative)  


 


Urine Blood   (Negative)  


 


Urine Nitrite   (Negative)  


 


Urine Bilirubin   (Negative)  


 


Urine Urobilinogen   (<2.0)  mg/dL


 


Ur Leukocyte Esterase   (Negative)  


 


Coronavirus (PCR)  Not Detected  (Not Detectd)  


 


Blood Type   


 


Blood Type Recheck   


 


Bld Type Recheck Status   


 


Antibody Screen   


 


Spec Expiration Date   














Disposition





<Samuel Phillip - Last Filed: 21 11:19>


Is patient prescribed a controlled substance at d/c from ED?: No


Decision to Admit Reason: Admit from EC


Decision Date: 21


Decision Time: 16:52





<Margoth Encinas - Last Filed: 21 14:48>


Clinical Impression: 


 Abdominal pain, Superficial bruising of abdominal wall, S/P AAA repair, End

oleak of aortic graft





Disposition: ADMITTED AS IP TO THIS HOSP


Condition: Fair

## 2021-11-01 NOTE — XR
EXAMINATION TYPE: XR chest 2V

 

DATE OF EXAM: 11/1/2021

 

COMPARISON: 11/20/2020.

 

HISTORY: Cough

 

TECHNIQUE:  Frontal and lateral views of the chest are obtained.

 

FINDINGS:  There is no focal air space opacity, pleural effusion, or pneumothorax seen.  The cardiac 
silhouette size is within normal limits.   The osseous structures are intact.

 

IMPRESSION:  No acute cardiopulmonary process.

## 2021-11-02 VITALS — DIASTOLIC BLOOD PRESSURE: 85 MMHG | HEART RATE: 84 BPM | SYSTOLIC BLOOD PRESSURE: 142 MMHG | TEMPERATURE: 98.7 F

## 2021-11-02 VITALS — RESPIRATION RATE: 18 BRPM

## 2021-11-02 LAB
ANION GAP SERPL CALC-SCNC: 6 MMOL/L
BASOPHILS # BLD AUTO: 0 K/UL (ref 0–0.2)
BASOPHILS NFR BLD AUTO: 0 %
BUN SERPL-SCNC: 20 MG/DL (ref 9–20)
CALCIUM SPEC-MCNC: 8.7 MG/DL (ref 8.4–10.2)
CHLORIDE SERPL-SCNC: 106 MMOL/L (ref 98–107)
CO2 SERPL-SCNC: 27 MMOL/L (ref 22–30)
EOSINOPHIL # BLD AUTO: 0.3 K/UL (ref 0–0.7)
EOSINOPHIL NFR BLD AUTO: 5 %
ERYTHROCYTE [DISTWIDTH] IN BLOOD BY AUTOMATED COUNT: 3.46 M/UL (ref 4.3–5.9)
ERYTHROCYTE [DISTWIDTH] IN BLOOD: 14.3 % (ref 11.5–15.5)
GLUCOSE SERPL-MCNC: 104 MG/DL (ref 74–99)
HCT VFR BLD AUTO: 32.6 % (ref 39–53)
HGB BLD-MCNC: 10.9 GM/DL (ref 13–17.5)
LYMPHOCYTES # SPEC AUTO: 1 K/UL (ref 1–4.8)
LYMPHOCYTES NFR SPEC AUTO: 19 %
MCH RBC QN AUTO: 31.4 PG (ref 25–35)
MCHC RBC AUTO-ENTMCNC: 33.4 G/DL (ref 31–37)
MCV RBC AUTO: 94.2 FL (ref 80–100)
MONOCYTES # BLD AUTO: 0.3 K/UL (ref 0–1)
MONOCYTES NFR BLD AUTO: 5 %
NEUTROPHILS # BLD AUTO: 3.6 K/UL (ref 1.3–7.7)
NEUTROPHILS NFR BLD AUTO: 67 %
PLATELET # BLD AUTO: 182 K/UL (ref 150–450)
POTASSIUM SERPL-SCNC: 5 MMOL/L (ref 3.5–5.1)
SODIUM SERPL-SCNC: 139 MMOL/L (ref 137–145)
WBC # BLD AUTO: 5.3 K/UL (ref 3.8–10.6)

## 2021-11-02 RX ADMIN — CEFAZOLIN SCH MLS/HR: 330 INJECTION, POWDER, FOR SOLUTION INTRAMUSCULAR; INTRAVENOUS at 02:10

## 2021-11-02 RX ADMIN — MORPHINE SULFATE PRN MG: 4 INJECTION, SOLUTION INTRAMUSCULAR; INTRAVENOUS at 02:10

## 2021-11-02 NOTE — P.DS
Providers


Date of admission: 


11/01/21 16:52





Expected date of discharge: 11/02/21


Attending physician: 


Ariel Philip





Consults: 





                                        





11/01/21 16:52


Consult Physician Urgent 


   Consulting Provider: Jhonatan Tinajero


   Consult Reason/Comments: endoleak, hx aaa repair


   Do you want consulting provider notified?: Already Contacted





11/02/21 12:57


Consult Physician Urgent 


   Consulting Provider: Brenda Blair


   Consult Reason/Comments: abd pain


   Do you want consulting provider notified?: Yes











Primary care physician: 


Ariel Philip





Hospital Course: 


66-year-old male with significant past medical history of AAA with a history of 

endovascular leak, hypertension, hyperlipidemia, carotid stenosis, coronary 

artery disease with heart catheterizations, hernia repair, mixed anxiety and 

depression, former nicotine dependence and several comorbidities is admitted to 

the hospital for abdominal pain, Coulter Agustin's sign, status post AAA repair, and

endoleak of aortic graft.  Patient had extensive diagnostic workup in emergency 

department consisting of CT abdomen and pelvis, dictation from radiologist 

enlarging aneurysm with evidence of endoleak; by mouth diagnostic labs 

hemoglobin 11.5, hematocrit 34.7, CMP, indirect  bicarb 18, BUN 26, creatinine 

1.48, mildly elevated glucose of 111, Covid test negative.  Emergency department

physician consulted vascular surgery, seen patient in the emergency department 

requesting CTA of the abdomen and pelvis for further imaging studies.  

Subsequent CT abdomen and pelvis showed no enlarging of the aneurysm and type II

endoleak.  Patient was evaluated by vascular surgery and cleared for discharge 

from standpoint.  Patient was evaluated by general surgery due to superficial 

ecchymosis and cleared for discharge from their standpoint.  Patient will be d

ischarged in a stable condition with guarded prognosis due to multiple 

comorbidities





Assessment: 


Abdominal pain


Superficial bruising of abdominal wall, consistent with Coulter Agustin's sign


Status post AAA repair


History of endoleak of aortic graft


History of CVA/TIA


Hypertension


Hyperlipidemia


Carotid stenosis


Coronary artery disease with heart catheterizations in the past


Hernia repair


History of back surgery


History of cholecystectomy


Mixed anxiety and depression


Former nicotine dependence


Obesity with a BMI of 34


Full code





Final diagnosis


Abdominal pain with history of AAA repair and endoleak of aortic graft, stable 

per vascular surgery


Superficial bruising of abdominal wall, ecchymosis will monitor labs


Acute kidney injury, improved with IV hydration and avoidance of nephrotoxic 

drugs


Health Concerns: 


Multiple comorbidities


Complexity medical treatment plan


Pertinent Studies: 


Initial CT abdomen and pelvis, enlarging aneurysm with evidence of endoleak


Subsequent CT abdomen and pelvis CTA, dictation from radiologist confirmation of

a type II endoleak, AAA not significantly changed or only slightly larger from 

presurgical images


Chest x-ray no acute cardiopulmonary processes noted





Procedures: 


No procedures performed during hospital stay





Patient Condition at Discharge: Fair





Plan - Discharge Summary


Discharge Rx Participant: Yes


New Discharge Prescriptions: 


New


   HYDROcodone/APAP 5-325MG [Norco 5-325] 1 tab PO Q6HR PRN 3 Days #12 tab


     PRN Reason: Pain





Continue


   Citalopram Hydrobromide [Citalopram HBr] 40 mg PO DAILY


   Cholecalciferol (Vitamin D3) [Vitamin D3] 2,000 unit PO DAILY


   Omeprazole 40 mg PO DAILY


   Clopidogrel [Plavix] 75 mg PO DAILY


   Aspirin 325 mg PO DAILY


   Atorvastatin Calcium [Lipitor] 10 mg PO HS


   Icosapent Ethyl [Vascepa] 2 gm PO DAILY #0


   Metoprolol Tartrate [Lopressor] 100 mg PO DAILY


   lisinopriL [Zestril] 20 mg PO BID


   Hydrochlorothiazide [hydroCHLOROthiazide] 12.5 mg PO HS


   Furosemide [Lasix] 20 mg PO DAILY PRN


     PRN Reason: Edema


   hydrOXYzine pamoate [hydrOXYzine PAMOATE] 25 mg PO QID PRN


     PRN Reason: ANXIETY/ITCHING


Discharge Medication List





Aspirin 325 mg PO DAILY 02/28/18 [History]


Cholecalciferol (Vitamin D3) [Vitamin D3] 2,000 unit PO DAILY 02/28/18 [History]


Citalopram Hydrobromide [Citalopram HBr] 40 mg PO DAILY 02/28/18 [History]


Clopidogrel [Plavix] 75 mg PO DAILY 02/28/18 [History]


Omeprazole 40 mg PO DAILY 02/28/18 [History]


Atorvastatin Calcium [Lipitor] 10 mg PO HS 11/17/20 [History]


Icosapent Ethyl [Vascepa] 2 gm PO DAILY #0 11/21/20 [History]


Metoprolol Tartrate [Lopressor] 100 mg PO DAILY 11/21/20 [History]


lisinopriL [Zestril] 20 mg PO BID 03/03/21 [History]


Furosemide [Lasix] 20 mg PO DAILY PRN 11/01/21 [History]


Hydrochlorothiazide [hydroCHLOROthiazide] 12.5 mg PO HS 11/01/21 [History]


hydrOXYzine pamoate [hydrOXYzine PAMOATE] 25 mg PO QID PRN 11/01/21 [History]


HYDROcodone/APAP 5-325MG [Norco 5-325] 1 tab PO Q6HR PRN 3 Days #12 tab 11/02/21

[Rx]








Follow up Appointment(s)/Referral(s): 


Ariel Philip MD [Primary Care Provider] - 1-2 days (Please call to make 

afollow up appointment)


Jhonatan Tinajero DO [STAFF PHYSICIAN] - 1 Week (Please call to make a follow up 

appointment)


Ambulatory/Diagnostic Orders: 


Complete Blood Count w/diff [LAB.AMB] Location: None Selected


Comprehensive Metabolic Panel [LAB.AMB] Location: None Selected


Magnesium [LAB.AMB] Location: None Selected


Prothrombin Time INR [LAB.AMB] Location: None Selected


Patient Instructions/Handouts:  Acute Kidney Injury (DC), Nonruptured Abdominal 

Aortic Aneurysm (DC), Acute Abdominal Pain (DC), Ecchymosis (GEN)


Discharge Disposition: HOME SELF-CARE

## 2021-11-02 NOTE — P.HPIM
History of Present Illness


H&P Date: 21


Chief Complaint: Periumbilical/left lower quadrant pain


66-year-old male with significant past medical history of AAA with a history of 

endovascular leak, hypertension, hyperlipidemia, carotid stenosis, coronary 

artery disease with heart catheterizations, hernia repair, mixed anxiety and 

depression, former nicotine dependence and several comorbidities is admitted to 

the hospital for abdominal pain, Coulter Agustin's sign, status post AAA repair, and

endoleak of aortic graft.  Patient had extensive diagnostic workup in emergency 

department consisting of CT abdomen and pelvis, dictation from radiologist 

enlarging aneurysm with evidence of endoleak; by mouth diagnostic labs 

hemoglobin 11.5, hematocrit 34.7, CMP, indirect  bicarb 18, BUN 26, creatinine 

1.48, mildly elevated glucose of 111, Covid test negative.  Emergency department

physician consulted vascular surgery, seen patient in the emergency department 

requesting CTA of the abdomen and pelvis for further imaging studies.








2021


Patient seen and examined at bedside.  Patient resting in bed.  Patient endorses

severe abdominal pain, mild shortness of breath, exertional shortness of breath 

and generalized fatigue.  Patient denies fever, chills, chest pain, 

palpitations, nausea, or diarrhea at this time.  Review of diagnostic labs; CBC 

hemoglobin 10.9, hematocrit 32.6; CMP improvement and hydration indirect bicarb 

27, renal function improved creatinine 1.13 BUN 20 GFR improved to 66.  Chest x-

ray no acute cardiopulmonary processes noted.  Awaiting on CT a of the abdomen 

and pelvis and expert opinion from surgical vascular team.











Review of Systems


Constitutional: Reports poor appetite, Reports weakness


Cardiovascular: Reports dyspnea on exertion


Gastrointestinal: Reports abdominal pain


Genitourinary: Reports as per HPI


Musculoskeletal: Reports muscle weakness


Integumentary: Reports unusual bruising (Periumbilical, left lower quadrant)


Neurological: Reports weakness


Psychiatric: Reports as per HPI


Endocrine: Reports as per HPI


Hematologic/Lymphatic: Reports easy bruising


Allergic/Immunologic: Reports as per HPI





Past Medical History


Past Medical History: Cancer, CVA/TIA, Hyperlipidemia, Hypertension


Additional Past Medical History / Comment(s): "rt carotid artery 100% blocked", 

past "stroke"  some mild short term memory problems", throat polyps removed pt 

stated were cancerous, ANEURYSM  ("CHEST AREA")- DR WATCHING IT"


History of Any Multi-Drug Resistant Organisms: None Reported


Past Surgical History: Back Surgery, Cholecystectomy, Heart Catheterization, 

Hernia Repair


Additional Past Surgical History / Comment(s): carolynn inguinal hernia repair, 

throat polpys removed


Past Anesthesia/Blood Transfusion Reactions: Previous Problems w/ Anesthesia, 

Motion Sickness


Additional Past Anesthesia/Blood Transfusion Reaction / Comment(s): STATES TAKES

LONGER TO WAKE UP"


Past Psychological History: Depression


Additional Psychological History / Comment(s): Pt resides with his spouse. He is

independent. pt works


Smoking Status: Former smoker


Past Alcohol Use History: None Reported


Additional Past Alcohol Use History / Comment(s): Pt started smoking at age 16 

() and quit , smoked 2-3 ppd. Pt stated sometimes i would light one but 

put it down to do something and never smoked it.


Past Drug Use History: None Reported





- Past Family History


  ** Mother


Family Medical History: CVA/TIA





  ** Father


Family Medical History: CVA/TIA, Myocardial Infarction (MI)


Additional Family Medical History / Comment(s): Father  of a MI while in his

60s or 70s





Medications and Allergies


Home Medications and Allergies Comment(s): 


Medications and ALLERGIES reviewed





                                Home Medications











 Medication  Instructions  Recorded  Confirmed  Type


 


RX: Aspirin 325 mg PO DAILY 18 History


 


RX: Cholecalciferol (Vitamin D3) 2,000 unit PO DAILY 18 History





[Vitamin D3]    


 


RX: Citalopram Hydrobromide 40 mg PO DAILY 18 History





[Citalopram HBr]    


 


RX: Clopidogrel [Plavix] 75 mg PO DAILY 18 History


 


RX: Omeprazole 40 mg PO DAILY 18 History


 


RX: Atorvastatin Calcium [Lipitor] 10 mg PO HS 20 History


 


RX: Icosapent Ethyl [Vascepa] 2 gm PO DAILY #0 20 History


 


RX: Metoprolol Tartrate [Lopressor] 100 mg PO DAILY 20 History


 


RX: lisinopriL [Zestril] 20 mg PO BID 21 History


 


Furosemide [Lasix] 20 mg PO DAILY PRN 21 History


 


Hydrochlorothiazide 12.5 mg PO HS 21 History





[hydroCHLOROthiazide]    


 


RX: hydrOXYzine pamoate 25 mg PO QID PRN 21 History





[hydrOXYzine PAMOATE]    








                                    Allergies











Allergy/AdvReac Type Severity Reaction Status Date / Time


 


hydrochlorothiazide AdvReac  Itching Verified 21 16:51














Physical Exam


Vitals: 


                                   Vital Signs











  Temp Pulse Pulse Resp BP BP Pulse Ox


 


 21 03:41  97.8 F   86  16   126/81  92 L


 


 21 00:00  97.5 F L   75  18   144/86  97


 


 21 20:00  98.1 F   74  18   142/70  93 L


 


 21 17:42   94   16  138/69   94 L


 


 21 15:19  98.3 F  89   16  152/70   98


 


 21 13:07  98.4 F  92   18  131/74   98








                                Intake and Output











 21





 22:59 06:59 14:59


 


Intake Total 240  


 


Balance 240  


 


Intake:   


 


  Oral 240  


 


Other:   


 


  Voiding Method  Toilet 


 


  # Voids 1 1 


 


  Weight 83.461 kg 84.3 kg 














- Constitutional


General appearance: mild distress





- EENT


Eyes: EOMI, PERRLA


ENT: normal oropharynx


Ears: bilateral: normal





- Neck


Neck: normal ROM


Carotids: bilateral: upstroke normal


Thyroid: bilateral: normal size





- Respiratory


Respiratory: bilateral: diminished (Anterior and posterior lung fields)





- Cardiovascular





Normal sinus rhythm


Heart rate: 84


Rhythm: regular


Heart sounds: normal: S1, S2


  ** dorsalis pedis


Peripheral Pulses: bilateral: Normal





  ** radial pulse


Peripheral Pulses: bilateral: Normal





- Gastrointestinal


General gastrointestinal: tenderness


Localized gastrointestinal: tender: diffuse, guarding: diffuse, rebound: diffuse





- Integumentary


Integumentary: pale





- Neurologic


Neurologic: CNII-XII intact





- Musculoskeletal


Musculoskeletal: gait normal





- Psychiatric


Psychiatric: A&O x's 3, appropriate affect, intact judgment & insight





Results


CBC & Chem 7: 


                                 21 05:35





                                 21 05:35


Labs: 


                  Abnormal Lab Results - Last 24 Hours (Table)











  21 Range/Units





  13:57 13:57 05:35 


 


RBC  3.77 L   3.46 L  (4.30-5.90)  m/uL


 


Hgb  11.5 L   10.9 L  (13.0-17.5)  gm/dL


 


Hct  34.7 L   32.6 L  (39.0-53.0)  %


 


Carbon Dioxide   18 L   (22-30)  mmol/L


 


BUN   26 H   (9-20)  mg/dL


 


Creatinine   1.48 H   (0.66-1.25)  mg/dL


 


Glucose   111 H   (74-99)  mg/dL














  21 Range/Units





  05:35 


 


RBC   (4.30-5.90)  m/uL


 


Hgb   (13.0-17.5)  gm/dL


 


Hct   (39.0-53.0)  %


 


Carbon Dioxide   (22-30)  mmol/L


 


BUN   (9-20)  mg/dL


 


Creatinine  1.31 H  (0.66-1.25)  mg/dL


 


Glucose  104 H  (74-99)  mg/dL











CT scan - abdomen: report reviewed





Thrombosis Risk Factor Assmnt





- Choose All That Apply


Any of the Below Risk Factors Present?: Yes


Each Factor Represents 1 point: Obesity (BMI >25)


Other Risk Factors: Yes


Each Risk Factor Represents 2 Points: Age 61-74 years


Each Risk Factor Represents 3 Points: History of DVT/PE


Thrombosis Risk Factor Assessment Total Risk Factor Score: 6


Thrombosis Risk Factor Assessment Level: High Risk





Assessment and Plan


Assessment: 


Abdominal pain


Superficial bruising of abdominal wall, consistent with Coulter Agustin's sign


Status post AAA repair


History of endoleak of aortic graft


History of CVA/TIA


Hypertension


Hyperlipidemia


Carotid stenosis


Coronary artery disease with heart catheterizations in the past


Hernia repair


History of back surgery


History of cholecystectomy


Mixed anxiety and depression


Former nicotine dependence


Obesity with a BMI of 34


Full code





Plan: 


Abdominal pain, continue consultation with vascular surgery for expert opinion, 

continue analgesics as needed, continue to monitor for increasing ecchymosis


Acute kidney injury, improved with IV hydration and avoidance of nephrotoxic 

drugs


Hypertension, hold nephrotoxic drugs at this time, hydralazine IV push as needed

for blood pressure greater or equal to 150/90


Continue to monitor vital signs and diagnostic testing


Continue medical management


Further recommendations come based on patient's clinical condition








Time with Patient: Greater than 30

## 2021-11-02 NOTE — P.GSCN
History of Present Illness


Consult date: 21


History of present illness: 





CHIEF COMPLAINT: Abdominal pain





HISTORY OF PRESENT ILLNESS: This is a 66-year-old male with a known past medical

history of abdominal aortic aneurysm with repair earlier this year.  He is on 

Plavix and aspirin.  He presented to the emergency room with complaints of 

abdominal pain and bruising over on his abdomen.  Patient had complained mostly 

of left upper quadrant pain for the past 2 days.  He reports that he has been 

coughing.  He denies any trauma to the area.  Patient had computed tomography 

scan of abdomen completed showing enlarging aneurysm with evidence of endoleak. 

Hepatic steatosis.  Probable superficial ecchymosis over the anterior abdomen.  

Patient also had a CTA of the abdomen demonstrates confirmation of type II end

oleak.  Native AAA not significant changed or only slightly larger from 

presurgical images.  Patient was seen by vascular surgery.  And they are 

recommending no surgical intervention.  During our examination patient denies 

abdominal pain.  He denies any blood in his stools.  Denies any diarrhea.  De

nies any heartburn symptoms or pain after eating.  He reports that his abdominal

pain is mostly worse after coughing. 





PAST MEDICAL HISTORY: 


See list.





PAST SURGICAL HISTORY: 


See list.





MEDICATIONS: 


See list.





ALLERGIES: 


See list.





SOCIAL HISTORY: No illicit drug use.  





REVIEW OF SYSTEMS: 


CONSTITUTIONAL: Denies fever or chills.


HEENT: Denies blurred vision, vision changes, or eye pain. Denies hemoptysis 


ENDOCRINE: Denies heat or cold intolerance.


CARDIOVASCULAR: Denies chest pain or pressure.


RESPIRATORY: No shortness of breath. 


GASTROINTESTINAL: Please refer to HPI


NEURO: Denies history of seizures.


PSYCH: No depression or suicidal ideation


HEMATOLOGIC: Denies bleeding disorders.


LYMPHATIC:  The patient denies any lumps and bumps around the neck. 


GENITOURINARY:  Denies any blood in urine or increased urinary frequency.  


MUSCULOSKELETAL:  Denies myalgias. Denies joint swelling. Denies decreased range

of motion beyond patients baseline.


SKIN: Denies pruitis. Denies rash.





PHYSICAL EXAM: 


VITAL SIGNS: Reviewed


GENERAL: Well-developed in no acute distress. 


HEENT:  No sclera icterus. Extraocular movements grossly intact.  Moist buccal 

mucosa. 


Head is atraumatic, normocephalic. Hears conversational speech. No nasal 

drainage.


NECK:  Supple without lymphadenopathy.


CHEST:  Non-labored respirations and equal bilateral excursions. 


CARDIOVASCULAR:  Palpable 2+ radial pulses.


ABDOMEN:  Soft.  Nondistended.


MUSCULOSKELETAL:  No clubbing or cyanosis.


NEUROLOGIC:  No focal or lateralizing signs.  Cranial nerves II through XII 

grossly intact.


PSYCH:  Appropriate affect.  Alert and oriented to person, place and time.


SKIN: Well perfused.  Good skin turgor. 





LABORATORY DATA:


WBC 5.3 hemoglobin down from 11.5-10.9 platelets 182


Sodium 139 potassium 5.0 BUN 20 creatinine 1.31


LFTs normal lipase normal


Urinalysis negative for infection


COVID-19 not detected





IMAGING:


Chest x-ray no acute cardiopulmonary process


Computed tomography scan of abdomen and CTA of abdomen and pelvis as stated 

above








ASSESSMENT: 


1.  Abdominal pain with ecchymosis


2.  Endoleak type II evaluated by vascular surgery


3.  Cough likely causing patient's abdominal pain


4.  History of abdominal aortic aneurysm status post repair





PLAN: 


-No surgical intervention planned per general surgery


-Patient followed by vascular surgery


-Continue supportive care





Thank you for this consultation











Physician Assistant note has been reviewed by physician. Signing provider agrees

with the documented findings, assessment, and plan of care. 





Past Medical History


Past Medical History: Cancer, CVA/TIA, Hyperlipidemia, Hypertension


Additional Past Medical History / Comment(s): "rt carotid artery 100% blocked", 

past "stroke"  some mild short term memory problems", throat polyps removed pt 

stated were cancerous, ANEURYSM  ("CHEST AREA")- DR WATCHING IT"


History of Any Multi-Drug Resistant Organisms: None Reported


Past Surgical History: Back Surgery, Cholecystectomy, Heart Catheterization, 

Hernia Repair


Additional Past Surgical History / Comment(s): carolynn inguinal hernia repair, 

throat polpys removed


Past Anesthesia/Blood Transfusion Reactions: Previous Problems w/ Anesthesia, 

Motion Sickness


Additional Past Anesthesia/Blood Transfusion Reaction / Comm: STATES TAKES 

LONGER TO WAKE UP"


Past Psychological History: Depression


Smoking Status: Former smoker


Past Alcohol Use History: None Reported


Past Drug Use History: None Reported





- Past Family History


  ** Mother


Family Medical History: CVA/TIA





  ** Father


Family Medical History: CVA/TIA, Myocardial Infarction (MI)


Additional Family Medical History / Comment(s): Father  of a MI while in his

60s or 70s





Medications and Allergies


                                Home Medications











 Medication  Instructions  Recorded  Confirmed  Type


 


Aspirin 325 mg PO DAILY 18 History


 


Cholecalciferol (Vitamin D3) 2,000 unit PO DAILY 18 History





[Vitamin D3]    


 


Citalopram Hydrobromide 40 mg PO DAILY 18 History





[Citalopram HBr]    


 


Clopidogrel [Plavix] 75 mg PO DAILY 18 History


 


Omeprazole 40 mg PO DAILY 18 History


 


Atorvastatin Calcium [Lipitor] 10 mg PO HS 20 History


 


Icosapent Ethyl [Vascepa] 2 gm PO DAILY #0 20 History


 


Metoprolol Tartrate [Lopressor] 100 mg PO DAILY 20 History


 


lisinopriL [Zestril] 20 mg PO BID 21 History


 


Furosemide [Lasix] 20 mg PO DAILY PRN 21 History


 


Hydrochlorothiazide 12.5 mg PO HS 21 History





[hydroCHLOROthiazide]    


 


hydrOXYzine pamoate [hydrOXYzine 25 mg PO QID PRN 21 History





PAMOATE]    








                                    Allergies











Allergy/AdvReac Type Severity Reaction Status Date / Time


 


hydrochlorothiazide AdvReac  Itching Verified 21 16:51














Surgical - Exam


                                   Vital Signs











Temp Pulse Resp BP Pulse Ox


 


 98.4 F   92   18   131/74   98 


 


 21 13:07  21 13:07  21 13:07  21 13:07  21 13:07














Results





- Labs





                                 21 05:35





                                 21 05:35


                  Abnormal Lab Results - Last 24 Hours (Table)











  21 Range/Units





  05:35 05:35 


 


RBC  3.46 L   (4.30-5.90)  m/uL


 


Hgb  10.9 L   (13.0-17.5)  gm/dL


 


Hct  32.6 L   (39.0-53.0)  %


 


Creatinine   1.31 H  (0.66-1.25)  mg/dL


 


Glucose   104 H  (74-99)  mg/dL








                                 Diabetes panel











  21 Range/Units





  05:35 


 


Sodium  139  (137-145)  mmol/L


 


Potassium  5.0  (3.5-5.1)  mmol/L


 


Chloride  106  ()  mmol/L


 


Carbon Dioxide  27  (22-30)  mmol/L


 


BUN  20  (9-20)  mg/dL


 


Creatinine  1.31 H  (0.66-1.25)  mg/dL


 


Glucose  104 H  (74-99)  mg/dL


 


Calcium  8.7  (8.4-10.2)  mg/dL








                                  Calcium panel











  21 Range/Units





  05:35 


 


Calcium  8.7  (8.4-10.2)  mg/dL








                                 Pituitary panel











  21 Range/Units





  05:35 


 


Sodium  139  (137-145)  mmol/L


 


Potassium  5.0  (3.5-5.1)  mmol/L


 


Chloride  106  ()  mmol/L


 


Carbon Dioxide  27  (22-30)  mmol/L


 


BUN  20  (9-20)  mg/dL


 


Creatinine  1.31 H  (0.66-1.25)  mg/dL


 


Glucose  104 H  (74-99)  mg/dL


 


Calcium  8.7  (8.4-10.2)  mg/dL








                                  Adrenal panel











  21 Range/Units





  05:35 


 


Sodium  139  (137-145)  mmol/L


 


Potassium  5.0  (3.5-5.1)  mmol/L


 


Chloride  106  ()  mmol/L


 


Carbon Dioxide  27  (22-30)  mmol/L


 


BUN  20  (9-20)  mg/dL


 


Creatinine  1.31 H  (0.66-1.25)  mg/dL


 


Glucose  104 H  (74-99)  mg/dL


 


Calcium  8.7  (8.4-10.2)  mg/dL

## 2021-11-02 NOTE — CT
EXAMINATION TYPE: CT angio abdomen pelvis

 

DATE OF EXAM: 11/2/2021

 

COMPARISON: CT abdomen smaller caliber pelvis one day ago

 

HISTORY: Acute abdominal wall ecchymosis, possible endoleak

 

CT DLP: 1990.6 mGycm, Automated Exposure Control for Dose Reduction was Utilized.

 

CONTRAST: 

CTA scan of the abdomen and pelvis is performed without oral and without and with IV Contrast, patien
t injected with 80 mL of Isovue 370. Stent graft protocol with 3-D reconstructed images created on an
 independent workstation and reviewed.

 

FINDINGS:

 

VASCULAR: Stent graft begins in the upper abdomen at level of SMA. There is patent celiac artery and 
SMA without significant stenosis. Bilateral renal arteries without significant stenosis. There is occ
lusion of the second smaller caliber right renal artery which is perfusing the anterior aspect lower 
pole right kidney seen prior study November 17, 2028 coronal images 44 through 49. Patent THU remains
 present. There is patency of the stent graft through native AAA measuring up to 5.3 cm AP diameter o
n axial image 78. This is not significantly changed from prior studies. There is however enhancement 
outside the stent graft along the left aspect right before iliac bifurcation which persists and becom
es slightly more prominent on delayed phased images extending superiorly. Suspect small-caliber lumba
r feeding vessel along the posterior aspect at level of bifurcation seen best image 93 series 501 Sta
ble slight aneurysm to the distal left common iliac artery. Patency through the the iliac bifurcation
 is present.

 

LUNG BASES:  No significant abnormality is appreciated.

 

LIVER/GB: Liver markedly hypodense consistent with diffuse fatty infiltration on noncontrast images.

 

PANCREAS:  No significant abnormality is seen.

 

SPLEEN:  No significant abnormality is seen.

 

ADRENALS:  No significant abnormality is seen.

 

KIDNEYS: Nonspecific mild/moderate perinephric fat stranding bilaterally. Symmetric cortical medullar
y uptake and excretion without hydronephrosis seen bilaterally. There is 2.1 cm partially exophytic t
hin-walled cyst medially from the anteromedial aspect right kidney. There is cortical thinning and di
minished enhancement to the anterior aspect lower pole right kidney noted.

 

BOWEL: A few scattered colonic diverticula. No CT evidence for acute diverticulitis

 

PROSTATE/SEMINAL VESICLES: Prostate upper limits of normal in size.

 

LYMPH NODES:  No greater than 1cm abdominal or pelvic lymph nodes are appreciated.

 

OSSEOUS STRUCTURES: Straightening of spine with mild multilevel spurring. Moderate axial joint space 
loss and acetabular spurring.

 

OTHER: Numerous coils in the anterior abdominal wall below rectus muscles. Persistent mild fat strand
ing or ecchymosis in the anterior abdominal wall of the mid abdomen near level of umbilicus. No focal
 fluid collection.

 

IMPRESSION: Confirmation of type II endoleak. Native AAA not significantly changed or only slightly l
arger from presurgical images. Postsurgical occlusion of the second smaller caliber lower right renal
 artery causing diminished perfusion to the anterior aspect lower pole right kidney and focal volume 
loss.

## 2021-11-02 NOTE — P.PN
Subjective


Progress Note Date: 11/02/21





She was seen and examined at the bedside.  He continues to have cough.  States 

that his abdominal tenderness mostly with moving or with palpation to the left 

lower abdomen.  His bruising has not changed.  His hemoglobin is stable at 10.9.

 He had a CT angiogram of the abdomen and pelvis that showed a type II endoleak 

and abdominal aortic aneurysm measuring 5.3 cm.





Objective





- Vital Signs


Vital signs: 


                                   Vital Signs











Temp  97.8 F   11/02/21 03:41


 


Pulse  86   11/02/21 03:41


 


Resp  16   11/02/21 03:41


 


BP  126/81   11/02/21 03:41


 


Pulse Ox  92 L  11/02/21 03:41








                                 Intake & Output











 11/01/21 11/02/21 11/02/21





 18:59 06:59 18:59


 


Intake Total  240 


 


Balance  240 


 


Weight 83.461 kg 84.3 kg 


 


Intake:   


 


  Oral  240 


 


Other:   


 


  Voiding Method  Toilet 


 


  # Voids  1 














- Exam





General appearance: The patient is alert, oriented, appears in no acute 

distress.


HET: Head is normocephalic and atraumatic.  


Neck: Supple without lymphadenopathy.  Trachea midline.


Heart: S1 S2.  Regular rate and rhythm.


Lungs: Diminished.


Abdomen: Soft, tender to palpation along the left abdominal wall where bruising 

is noted.   Ecchymosis marked, no increase in size.


Extremities: Normal skin color and turgor. 


Neurological: No focal deficits.  Alert and oriented 3.





- Labs


CBC & Chem 7: 


                                 11/02/21 05:35





                                 11/02/21 05:35


Labs: 


                  Abnormal Lab Results - Last 24 Hours (Table)











  11/01/21 11/01/21 11/02/21 Range/Units





  13:57 13:57 05:35 


 


RBC  3.77 L   3.46 L  (4.30-5.90)  m/uL


 


Hgb  11.5 L   10.9 L  (13.0-17.5)  gm/dL


 


Hct  34.7 L   32.6 L  (39.0-53.0)  %


 


Carbon Dioxide   18 L   (22-30)  mmol/L


 


BUN   26 H   (9-20)  mg/dL


 


Creatinine   1.48 H   (0.66-1.25)  mg/dL


 


Glucose   111 H   (74-99)  mg/dL














  11/02/21 Range/Units





  05:35 


 


RBC   (4.30-5.90)  m/uL


 


Hgb   (13.0-17.5)  gm/dL


 


Hct   (39.0-53.0)  %


 


Carbon Dioxide   (22-30)  mmol/L


 


BUN   (9-20)  mg/dL


 


Creatinine  1.31 H  (0.66-1.25)  mg/dL


 


Glucose  104 H  (74-99)  mg/dL














Assessment and Plan


Assessment: 





1.  Endoleak type II, known 


2.  Abdominal aortic aneurysm measuring 5.3 cm status post EVAR 3/2021  (AAA 

improved from 5.5 cm)


3.  Abdominal pain with ecchymosis


4.  Cough


Plan: 





CTA abdomen and pelvis reviewed by Dr. Tinajero.  Type II endoleak noted, which 

this has been known.  Abdominal aortic aneurysm measuring 5.3 cm which is 

improved from 5.5 cm status post EVAR 3/2021.  Hemoglobin is stable with no 

evidence of ongoing bleed.  Ecchymosis has been marked with no increase in size.

 There is no indication for any vascular surgical intervention.  Patient will 

follow-up with cardiology and vascular surgery outpatient for monitoring of 

endoleak.  Patient is cleared for discharge from vascular surgery.  








The impression and plan of care has been dictated as directed.





Dr. Tinajero


I performed a history and examination of this patient,  discussed the same with 

the dictator.  I agree with the dictator's note ,documented as a scribe.  Any 

additional findings or plans will be noted.

## 2022-06-20 ENCOUNTER — HOSPITAL ENCOUNTER (OUTPATIENT)
Dept: HOSPITAL 47 - RADXRMAIN | Age: 67
Discharge: HOME | End: 2022-06-20
Payer: COMMERCIAL

## 2022-06-20 DIAGNOSIS — M19.072: Primary | ICD-10-CM

## 2022-06-20 NOTE — XR
Left foot

 

HISTORY: M 79.672

 

3 views the left foot

 

There is degenerative change at the metatarsophalangeal joint of the first digit. Alignment is near-a
natomic, there may be slight hallux valgus deformity. Bone mineralization is reduced. There is a plan
tar calcaneal spur.

 

IMPRESSION: No fracture or dislocation.